# Patient Record
Sex: FEMALE | Race: WHITE | HISPANIC OR LATINO | Employment: OTHER | URBAN - METROPOLITAN AREA
[De-identification: names, ages, dates, MRNs, and addresses within clinical notes are randomized per-mention and may not be internally consistent; named-entity substitution may affect disease eponyms.]

---

## 2018-10-18 ENCOUNTER — HOSPITAL ENCOUNTER (EMERGENCY)
Facility: HOSPITAL | Age: 58
Discharge: HOME | End: 2018-10-18
Attending: EMERGENCY MEDICINE
Payer: MEDICARE

## 2018-10-18 VITALS
DIASTOLIC BLOOD PRESSURE: 69 MMHG | TEMPERATURE: 98 F | SYSTOLIC BLOOD PRESSURE: 123 MMHG | BODY MASS INDEX: 39.38 KG/M2 | HEIGHT: 62 IN | WEIGHT: 214 LBS | HEART RATE: 81 BPM | RESPIRATION RATE: 17 BRPM | OXYGEN SATURATION: 99 %

## 2018-10-18 DIAGNOSIS — R42 DIZZINESS: Primary | ICD-10-CM

## 2018-10-18 PROBLEM — Z79.4 CONTROLLED TYPE 2 DIABETES MELLITUS WITH COMPLICATION, WITH LONG-TERM CURRENT USE OF INSULIN: Status: ACTIVE | Noted: 2018-10-18

## 2018-10-18 PROBLEM — E78.2 MIXED HYPERLIPIDEMIA: Status: ACTIVE | Noted: 2018-10-18

## 2018-10-18 PROBLEM — E11.8 CONTROLLED TYPE 2 DIABETES MELLITUS WITH COMPLICATION, WITH LONG-TERM CURRENT USE OF INSULIN: Status: ACTIVE | Noted: 2018-10-18

## 2018-10-18 PROBLEM — E03.9 HYPOTHYROIDISM: Status: ACTIVE | Noted: 2018-10-18

## 2018-10-18 PROBLEM — I10 ESSENTIAL HYPERTENSION: Status: ACTIVE | Noted: 2018-10-18

## 2018-10-18 LAB
ALBUMIN SERPL BCP-MCNC: 4.1 G/DL
ALP SERPL-CCNC: 100 U/L
ALT SERPL W/O P-5'-P-CCNC: 8 U/L
ANION GAP SERPL CALC-SCNC: 15 MMOL/L
AST SERPL-CCNC: 14 U/L
BASOPHILS # BLD AUTO: 0.05 K/UL
BASOPHILS NFR BLD: 0.4 %
BILIRUB SERPL-MCNC: 0.5 MG/DL
BUN SERPL-MCNC: 19 MG/DL
CALCIUM SERPL-MCNC: 9.9 MG/DL
CHLORIDE SERPL-SCNC: 106 MMOL/L
CO2 SERPL-SCNC: 18 MMOL/L
CREAT SERPL-MCNC: 1.3 MG/DL
DIFFERENTIAL METHOD: ABNORMAL
EOSINOPHIL # BLD AUTO: 0.1 K/UL
EOSINOPHIL NFR BLD: 0.5 %
ERYTHROCYTE [DISTWIDTH] IN BLOOD BY AUTOMATED COUNT: 16.9 %
EST. GFR  (AFRICAN AMERICAN): 51.5 ML/MIN/1.73 M^2
EST. GFR  (NON AFRICAN AMERICAN): 44.7 ML/MIN/1.73 M^2
GLUCOSE SERPL-MCNC: 284 MG/DL
HCT VFR BLD AUTO: 36.9 %
HGB BLD-MCNC: 11.7 G/DL
IMM GRANULOCYTES # BLD AUTO: 0.05 K/UL
IMM GRANULOCYTES NFR BLD AUTO: 0.4 %
LYMPHOCYTES # BLD AUTO: 1.6 K/UL
LYMPHOCYTES NFR BLD: 12 %
MCH RBC QN AUTO: 26.1 PG
MCHC RBC AUTO-ENTMCNC: 31.7 G/DL
MCV RBC AUTO: 82 FL
MONOCYTES # BLD AUTO: 0.6 K/UL
MONOCYTES NFR BLD: 4.6 %
NEUTROPHILS # BLD AUTO: 11.2 K/UL
NEUTROPHILS NFR BLD: 82.1 %
NRBC BLD-RTO: 0 /100 WBC
PLATELET # BLD AUTO: 373 K/UL
PMV BLD AUTO: 12.5 FL
POCT GLUCOSE: 260 MG/DL (ref 70–110)
POTASSIUM SERPL-SCNC: 4.2 MMOL/L
PROT SERPL-MCNC: 8.1 G/DL
RBC # BLD AUTO: 4.48 M/UL
SODIUM SERPL-SCNC: 139 MMOL/L
TROPONIN I SERPL DL<=0.01 NG/ML-MCNC: <0.006 NG/ML
WBC # BLD AUTO: 13.62 K/UL

## 2018-10-18 PROCEDURE — 25000003 PHARM REV CODE 250: Performed by: EMERGENCY MEDICINE

## 2018-10-18 PROCEDURE — 96361 HYDRATE IV INFUSION ADD-ON: CPT

## 2018-10-18 PROCEDURE — 84484 ASSAY OF TROPONIN QUANT: CPT

## 2018-10-18 PROCEDURE — 82962 GLUCOSE BLOOD TEST: CPT

## 2018-10-18 PROCEDURE — 99284 EMERGENCY DEPT VISIT MOD MDM: CPT | Mod: ,,, | Performed by: NURSE PRACTITIONER

## 2018-10-18 PROCEDURE — 93010 ELECTROCARDIOGRAM REPORT: CPT | Mod: ,,, | Performed by: INTERNAL MEDICINE

## 2018-10-18 PROCEDURE — 80053 COMPREHEN METABOLIC PANEL: CPT

## 2018-10-18 PROCEDURE — 99284 EMERGENCY DEPT VISIT MOD MDM: CPT | Mod: ,,, | Performed by: EMERGENCY MEDICINE

## 2018-10-18 PROCEDURE — 63600175 PHARM REV CODE 636 W HCPCS: Performed by: EMERGENCY MEDICINE

## 2018-10-18 PROCEDURE — 96374 THER/PROPH/DIAG INJ IV PUSH: CPT

## 2018-10-18 PROCEDURE — 85025 COMPLETE CBC W/AUTO DIFF WBC: CPT

## 2018-10-18 PROCEDURE — 99285 EMERGENCY DEPT VISIT HI MDM: CPT | Mod: 25

## 2018-10-18 RX ORDER — LISINOPRIL 2.5 MG/1
2.5 TABLET ORAL DAILY
COMMUNITY

## 2018-10-18 RX ORDER — DIAZEPAM 5 MG/1
10 TABLET ORAL
Status: DISCONTINUED | OUTPATIENT
Start: 2018-10-18 | End: 2018-10-18

## 2018-10-18 RX ORDER — GABAPENTIN 300 MG/1
300 CAPSULE ORAL 3 TIMES DAILY
Status: ON HOLD | COMMUNITY
End: 2022-08-01

## 2018-10-18 RX ORDER — ONDANSETRON 4 MG/1
4 TABLET, ORALLY DISINTEGRATING ORAL
Status: COMPLETED | OUTPATIENT
Start: 2018-10-18 | End: 2018-10-18

## 2018-10-18 RX ORDER — ALLOPURINOL 100 MG/1
100 TABLET ORAL DAILY
COMMUNITY

## 2018-10-18 RX ORDER — GLIPIZIDE 10 MG/1
10 TABLET ORAL
COMMUNITY
End: 2019-12-20 | Stop reason: SDUPTHER

## 2018-10-18 RX ORDER — ONDANSETRON 4 MG/1
4 TABLET, FILM COATED ORAL EVERY 6 HOURS PRN
Qty: 12 TABLET | Refills: 0 | Status: ON HOLD | OUTPATIENT
Start: 2018-10-18 | End: 2022-08-01

## 2018-10-18 RX ORDER — MECLIZINE HCL 12.5 MG 12.5 MG/1
25 TABLET ORAL
Status: COMPLETED | OUTPATIENT
Start: 2018-10-18 | End: 2018-10-18

## 2018-10-18 RX ORDER — LEVOTHYROXINE SODIUM 75 UG/1
75 TABLET ORAL DAILY
COMMUNITY

## 2018-10-18 RX ORDER — MECLIZINE HYDROCHLORIDE 25 MG/1
25 TABLET ORAL 3 TIMES DAILY PRN
Qty: 20 TABLET | Refills: 0 | Status: SHIPPED | OUTPATIENT
Start: 2018-10-18

## 2018-10-18 RX ORDER — DULOXETIN HYDROCHLORIDE 60 MG/1
60 CAPSULE, DELAYED RELEASE ORAL DAILY
COMMUNITY

## 2018-10-18 RX ORDER — VALACYCLOVIR HYDROCHLORIDE 500 MG/1
500 TABLET, FILM COATED ORAL 2 TIMES DAILY
COMMUNITY

## 2018-10-18 RX ORDER — SIMVASTATIN 40 MG/1
40 TABLET, FILM COATED ORAL NIGHTLY
COMMUNITY

## 2018-10-18 RX ORDER — OXYBUTYNIN CHLORIDE 10 MG/1
10 TABLET, EXTENDED RELEASE ORAL DAILY
Status: ON HOLD | COMMUNITY
End: 2022-08-01

## 2018-10-18 RX ORDER — METFORMIN HYDROCHLORIDE 1000 MG/1
1000 TABLET ORAL 2 TIMES DAILY WITH MEALS
COMMUNITY
End: 2019-12-20 | Stop reason: SDUPTHER

## 2018-10-18 RX ADMIN — MECLIZINE 25 MG: 12.5 TABLET ORAL at 05:10

## 2018-10-18 RX ADMIN — SODIUM CHLORIDE 1000 ML: 0.9 INJECTION, SOLUTION INTRAVENOUS at 04:10

## 2018-10-18 RX ADMIN — INSULIN HUMAN 5 UNITS: 100 INJECTION, SOLUTION PARENTERAL at 04:10

## 2018-10-18 RX ADMIN — ONDANSETRON 4 MG: 4 TABLET, ORALLY DISINTEGRATING ORAL at 03:10

## 2018-10-18 NOTE — ED NOTES
Physician at bedside.NEUROLOGY MD at bedside and pt is cleared by her--MRI is also WNL. Pt will be d/eduar soon

## 2018-10-18 NOTE — CONSULTS
[facility]  Vascular Neurology  Comprehensive Stroke Center  Consult Note    Inpatient consult to Vascular (Stroke) Neurology  Consult performed by: Brenda Lyman, ADAN, NP  Consult ordered by: Shannon Bridges MD  Reason for consult: dizziness        Assessment/Plan:       Dizziness    58 y.o. female with significant past medical history of DM II, HLD, HTN, hypothyroidism presented to hospital complaining of dizziness since Tuesday.    -MRI/A negative for acute findings       Controlled type 2 diabetes mellitus with complication, with long-term current use of insulin    -Stroke risk factor.  Glucose 284.  Tight glucose control.     Hypothyroidism    -Stroke risk factor.  No recorded TSH.  -Continue Synthroid     Mixed hyperlipidemia    -Stroke risk factor.  No recorded LDL.  -Continue Simvastatin     Essential hypertension    -Stroke risk factor.  Goal SBP<140         STROKE DOCUMENTATION          NIH Scale:  Interval: baseline (upon arrival/admit)  1a. Level Of Consciousness: 0-->Alert: keenly responsive  1b. LOC Questions: 0-->Answers both questions correctly  1c. LOC Commands: 0-->Performs both tasks correctly  2. Best Gaze: 0-->Normal  3. Visual: 0-->No visual loss  4. Facial Palsy: 0-->Normal symmetrical movements  5a. Motor Arm, Left: 0-->No drift: limb holds 90 (or 45) degrees for full 10 secs  5b. Motor Arm, Right: 0-->No drift: limb holds 90 (or 45) degrees for full 10 secs  6a. Motor Leg, Left: 0-->No drift: leg holds 30 degree position for full 5 secs  6b. Motor Leg, Right: 0-->No drift: leg holds 30 degree position for full 5 secs  7. Limb Ataxia: 0-->Absent  8. Sensory: 0-->Normal: no sensory loss  9. Best Language: 0-->No aphasia: normal  10. Dysarthria: 0-->Normal  11. Extinction and Inattention (formerly Neglect): 0-->No abnormality  Total (NIH Stroke Scale): 0    Modified Judie Score: 0  Greenwood Coma Scale:15   ABCD2 Score:    [id number] Score:   HAS -BLED  "Score:   ICH Score:   Hunt & Dubon Classification:       Subjective:     History of Present Illness:  Patient is a 58 y.o. female with significant past medical history of DM II, HLD, HTN, hypothyroidism presented to hospital complaining of dizziness since Tuesday.  The patient denies head trauma.  She describes her dizziness as sensation that she is moving.  The patient endorses N/V, HA, generalized weakness and fatigue but denies slurred speech, vision disturbance, or numbness.  She states her blood glucose has been "good" at home.    ED workup included an MRI/MRA brain that was negative for acute findings.  NIH 0.  Given physical exam findings, MRI results Vascular Neurology will sign off at this time.            Past Medical History:   Diagnosis Date    Diabetes mellitus      No past surgical history on file.  No family history on file.  Social History     Tobacco Use    Smoking status: Not on file   Substance Use Topics    Alcohol use: Not on file    Drug use: Not on file     Review of patient's allergies indicates:  No Known Allergies    Medications: I have reviewed the current medication administration record.    Current Outpatient Medications:     allopurinol (ZYLOPRIM) 100 MG tablet, Take 100 mg by mouth once daily., Disp: , Rfl:     DULoxetine (CYMBALTA) 60 MG capsule, Take 60 mg by mouth once daily., Disp: , Rfl:     gabapentin (NEURONTIN) 300 MG capsule, Take 300 mg by mouth 3 (three) times daily., Disp: , Rfl:     glipiZIDE (GLUCOTROL) 10 MG tablet, Take 10 mg by mouth 2 (two) times daily before meals., Disp: , Rfl:     levothyroxine (SYNTHROID) 75 MCG tablet, Take 75 mcg by mouth once daily., Disp: , Rfl:     lisinopril (PRINIVIL,ZESTRIL) 2.5 MG tablet, Take 2.5 mg by mouth once daily., Disp: , Rfl:     metFORMIN (GLUCOPHAGE) 1000 MG tablet, Take 1,000 mg by mouth 2 (two) times daily with meals., Disp: , Rfl:     oxybutynin (DITROPAN-XL) 10 MG 24 hr tablet, Take 10 mg by mouth once daily., " Disp: , Rfl:     simvastatin (ZOCOR) 40 MG tablet, Take 40 mg by mouth every evening., Disp: , Rfl:     valACYclovir (VALTREX) 500 MG tablet, Take 500 mg by mouth 2 (two) times daily., Disp: , Rfl:     meclizine (ANTIVERT) 25 mg tablet, Take 1 tablet (25 mg total) by mouth 3 (three) times daily as needed for Dizziness., Disp: 20 tablet, Rfl: 0      Review of Systems   Constitutional: Positive for fatigue. Negative for fever.   HENT: Negative for drooling and trouble swallowing.    Eyes: Negative for discharge, redness and visual disturbance.   Respiratory: Negative for cough and shortness of breath.    Cardiovascular: Negative for chest pain and palpitations.   Gastrointestinal: Positive for nausea and vomiting. Negative for abdominal pain and diarrhea.   Endocrine: Negative for cold intolerance and heat intolerance.   Genitourinary: Negative for hematuria.   Musculoskeletal: Positive for gait problem.   Skin: Negative for rash.   Allergic/Immunologic: Negative for environmental allergies and food allergies.   Neurological: Positive for dizziness and headaches. Negative for facial asymmetry, speech difficulty, weakness and numbness.   Psychiatric/Behavioral: Negative for agitation and confusion.     Objective:     Vital Signs (Most Recent):  Temp: 98.5 °F (36.9 °C) (10/18/18 0136)  Pulse: 103 (10/18/18 0136)  Resp: 16 (10/18/18 0136)  BP: 109/67 (10/18/18 0136)  SpO2: 97 % (10/18/18 0136)    Vital Signs Range (Last 24H):  Temp:  [98.5 °F (36.9 °C)]   Pulse:  [103]   Resp:  [16]   BP: (109)/(67)   SpO2:  [97 %]     Physical Exam   Constitutional: She is oriented to person, place, and time. She appears well-developed and well-nourished. No distress.   HENT:   Head: Normocephalic and atraumatic.   Right Ear: External ear normal.   Left Ear: External ear normal.   Nose: Nose normal.   Mouth/Throat: Oropharynx is clear and moist. No oropharyngeal exudate.   Eyes: Conjunctivae and EOM are normal. Pupils are equal,  round, and reactive to light. Right eye exhibits no discharge. Left eye exhibits no discharge. No scleral icterus.   Neck: Normal range of motion. Neck supple. No thyromegaly present.   Cardiovascular: Normal rate, regular rhythm and normal heart sounds.   Pulmonary/Chest: Effort normal and breath sounds normal. No respiratory distress. She has no wheezes. She has no rhonchi. She has no rales.   Abdominal: Soft. Bowel sounds are normal. She exhibits no distension. There is no hepatosplenomegaly. There is no tenderness.   Musculoskeletal: She exhibits no edema.   Lymphadenopathy:     She has no cervical adenopathy.   Neurological: She is alert and oriented to person, place, and time.   Skin: Skin is warm and dry. Capillary refill takes less than 2 seconds. She is not diaphoretic.   Psychiatric: She has a normal mood and affect.   Nursing note and vitals reviewed.      Neurological Exam:   LOC: alert  Attention Span: Good   Language: No aphasia  Articulation: No dysarthria  Orientation: Person, Place, Time   Visual Fields: Full  EOM (CN III, IV, VI): Full/intact  Pupils (CN II, III): PERRL  Facial Movement (CN VII): Symmetric facial expression    Motor: Arm left  Normal 5/5  Leg left  Normal 5/5  Arm right  Normal 5/5  Leg right Normal 5/5  Cebellar: No evidence of appendicular or axial ataxia      Laboratory:  CMP:   Recent Labs   Lab 10/18/18  0230   CALCIUM 9.9   ALBUMIN 4.1   PROT 8.1      K 4.2   CO2 18*      BUN 19   CREATININE 1.3   ALKPHOS 100   ALT 8*   AST 14   BILITOT 0.5     CBC:   Recent Labs   Lab 10/18/18  0230   WBC 13.62*   RBC 4.48   HGB 11.7*   HCT 36.9*   *   MCV 82   MCH 26.1*   MCHC 31.7*     Lipid Panel: No results for input(s): CHOL, LDLCALC, HDL, TRIG in the last 168 hours.  Coagulation: No results for input(s): PT, INR, APTT in the last 168 hours.  Hgb A1C: No results for input(s): HGBA1C in the last 168 hours.  TSH: No results for input(s): TSH in the last 168  hours.    Diagnostic Results:      Brain imaging:  MRI Brain 10/18/2018 negative for acute findings    Vessel Imaging:  MRA Brain 10/18/2018 negative for acute findings    Cardiac Evaluation:   None      Brenda Lyman DNP, NP  [facility]  Department of Vascular Neurology   [facility]

## 2018-10-18 NOTE — SUBJECTIVE & OBJECTIVE
Past Medical History:   Diagnosis Date    Diabetes mellitus      No past surgical history on file.  No family history on file.  Social History     Tobacco Use    Smoking status: Not on file   Substance Use Topics    Alcohol use: Not on file    Drug use: Not on file     Review of patient's allergies indicates:  No Known Allergies    Medications: I have reviewed the current medication administration record.    Current Outpatient Medications:     allopurinol (ZYLOPRIM) 100 MG tablet, Take 100 mg by mouth once daily., Disp: , Rfl:     DULoxetine (CYMBALTA) 60 MG capsule, Take 60 mg by mouth once daily., Disp: , Rfl:     gabapentin (NEURONTIN) 300 MG capsule, Take 300 mg by mouth 3 (three) times daily., Disp: , Rfl:     glipiZIDE (GLUCOTROL) 10 MG tablet, Take 10 mg by mouth 2 (two) times daily before meals., Disp: , Rfl:     levothyroxine (SYNTHROID) 75 MCG tablet, Take 75 mcg by mouth once daily., Disp: , Rfl:     lisinopril (PRINIVIL,ZESTRIL) 2.5 MG tablet, Take 2.5 mg by mouth once daily., Disp: , Rfl:     metFORMIN (GLUCOPHAGE) 1000 MG tablet, Take 1,000 mg by mouth 2 (two) times daily with meals., Disp: , Rfl:     oxybutynin (DITROPAN-XL) 10 MG 24 hr tablet, Take 10 mg by mouth once daily., Disp: , Rfl:     simvastatin (ZOCOR) 40 MG tablet, Take 40 mg by mouth every evening., Disp: , Rfl:     valACYclovir (VALTREX) 500 MG tablet, Take 500 mg by mouth 2 (two) times daily., Disp: , Rfl:     meclizine (ANTIVERT) 25 mg tablet, Take 1 tablet (25 mg total) by mouth 3 (three) times daily as needed for Dizziness., Disp: 20 tablet, Rfl: 0      Review of Systems   Constitutional: Positive for fatigue. Negative for fever.   HENT: Negative for drooling and trouble swallowing.    Eyes: Negative for discharge, redness and visual disturbance.   Respiratory: Negative for cough and shortness of breath.    Cardiovascular: Negative for chest pain and palpitations.   Gastrointestinal: Positive for nausea and  vomiting. Negative for abdominal pain and diarrhea.   Endocrine: Negative for cold intolerance and heat intolerance.   Genitourinary: Negative for hematuria.   Musculoskeletal: Positive for gait problem.   Skin: Negative for rash.   Allergic/Immunologic: Negative for environmental allergies and food allergies.   Neurological: Positive for dizziness and headaches. Negative for facial asymmetry, speech difficulty, weakness and numbness.   Psychiatric/Behavioral: Negative for agitation and confusion.     Objective:     Vital Signs (Most Recent):  Temp: 98.5 °F (36.9 °C) (10/18/18 0136)  Pulse: 103 (10/18/18 0136)  Resp: 16 (10/18/18 0136)  BP: 109/67 (10/18/18 0136)  SpO2: 97 % (10/18/18 0136)    Vital Signs Range (Last 24H):  Temp:  [98.5 °F (36.9 °C)]   Pulse:  [103]   Resp:  [16]   BP: (109)/(67)   SpO2:  [97 %]     Physical Exam   Constitutional: She is oriented to person, place, and time. She appears well-developed and well-nourished. No distress.   HENT:   Head: Normocephalic and atraumatic.   Right Ear: External ear normal.   Left Ear: External ear normal.   Nose: Nose normal.   Mouth/Throat: Oropharynx is clear and moist. No oropharyngeal exudate.   Eyes: Conjunctivae and EOM are normal. Pupils are equal, round, and reactive to light. Right eye exhibits no discharge. Left eye exhibits no discharge. No scleral icterus.   Neck: Normal range of motion. Neck supple. No thyromegaly present.   Cardiovascular: Normal rate, regular rhythm and normal heart sounds.   Pulmonary/Chest: Effort normal and breath sounds normal. No respiratory distress. She has no wheezes. She has no rhonchi. She has no rales.   Abdominal: Soft. Bowel sounds are normal. She exhibits no distension. There is no hepatosplenomegaly. There is no tenderness.   Musculoskeletal: She exhibits no edema.   Lymphadenopathy:     She has no cervical adenopathy.   Neurological: She is alert and oriented to person, place, and time.   Skin: Skin is warm and  dry. Capillary refill takes less than 2 seconds. She is not diaphoretic.   Psychiatric: She has a normal mood and affect.   Nursing note and vitals reviewed.      Neurological Exam:   LOC: alert  Attention Span: Good   Language: No aphasia  Articulation: No dysarthria  Orientation: Person, Place, Time   Visual Fields: Full  EOM (CN III, IV, VI): Full/intact  Pupils (CN II, III): PERRL  Facial Movement (CN VII): Symmetric facial expression    Motor: Arm left  Normal 5/5  Leg left  Normal 5/5  Arm right  Normal 5/5  Leg right Normal 5/5  Cebellar: No evidence of appendicular or axial ataxia      Laboratory:  CMP:   Recent Labs   Lab 10/18/18  0230   CALCIUM 9.9   ALBUMIN 4.1   PROT 8.1      K 4.2   CO2 18*      BUN 19   CREATININE 1.3   ALKPHOS 100   ALT 8*   AST 14   BILITOT 0.5     CBC:   Recent Labs   Lab 10/18/18  0230   WBC 13.62*   RBC 4.48   HGB 11.7*   HCT 36.9*   *   MCV 82   MCH 26.1*   MCHC 31.7*     Lipid Panel: No results for input(s): CHOL, LDLCALC, HDL, TRIG in the last 168 hours.  Coagulation: No results for input(s): PT, INR, APTT in the last 168 hours.  Hgb A1C: No results for input(s): HGBA1C in the last 168 hours.  TSH: No results for input(s): TSH in the last 168 hours.    Diagnostic Results:      Brain imaging:  MRI Brain 10/18/2018 negative for acute findings    Vessel Imaging:  MRA Brain 10/18/2018 negative for acute findings    Cardiac Evaluation:   None

## 2018-10-18 NOTE — ED PROVIDER NOTES
Encounter Date: 10/18/2018  SCRIBE #1 NOTE: I, Jalil Isabel, am scribing for, and in the presence of,  Zane Espinoza MD.      History     Chief Complaint   Patient presents with    Dizziness     Pt to ER c/o dizziness, HA, N/V and back pain starting yesterday.      Patient is a 58-year-old female with history of IDDM, herpes, anxiety, depression who presents to emergency department with dizziness.  Patient remarks acute onset of dizziness yesterday morning.  Endorses true vertiginous symptoms. States that is intermittent in nature.  Endorses 1 episode without dizziness lasting about an hour while she was showering.  Patient denies a prior history of dizziness.  Dizziness associated with some upper back pain. Patient denies any chest pain, palpitations, shortness of breath. Pt denies any confusion, changes in vision, slurring of speech, difficulty forming words, difficulty understanding this provider, unilateral weakness. No new medications.  No fevers at home.  Patient denies any sick contacts.  No exacerbating or relieving factors.          Review of patient's allergies indicates:  No Known Allergies  Past Medical History:   Diagnosis Date    Diabetes mellitus      No past surgical history on file.  No family history on file.  Social History     Tobacco Use    Smoking status: Not on file   Substance Use Topics    Alcohol use: Not on file    Drug use: Not on file     Review of Systems   Constitutional: Negative for chills and fever.   HENT: Negative for congestion.    Eyes: Negative for visual disturbance.   Respiratory: Negative for cough, shortness of breath and wheezing.    Cardiovascular: Negative for chest pain.   Gastrointestinal: Negative for abdominal distention, abdominal pain, blood in stool, constipation, diarrhea, nausea and vomiting.   Genitourinary: Negative for dysuria and hematuria.   Musculoskeletal: Negative for myalgias.   Skin: Negative for rash.   Neurological: Positive for dizziness and  light-headedness. Negative for syncope, facial asymmetry, speech difficulty, weakness, numbness and headaches.       Physical Exam     Initial Vitals [10/18/18 0136]   BP Pulse Resp Temp SpO2   109/67 103 16 98.5 °F (36.9 °C) 97 %      MAP       --         Physical Exam    Nursing note and vitals reviewed.  Constitutional: She appears well-developed and well-nourished. No distress.   HENT:   Head: Normocephalic and atraumatic.   Eyes: EOM are normal. Pupils are equal, round, and reactive to light.   Neck: Normal range of motion. Neck supple.   Cardiovascular: Normal rate, regular rhythm and normal heart sounds. Exam reveals no gallop and no friction rub.    No murmur heard.  Pulmonary/Chest: Breath sounds normal. No respiratory distress. She has no wheezes. She has no rhonchi. She has no rales.   Abdominal: Soft. She exhibits no distension. There is no tenderness. There is no rebound and no guarding.   Bedside ultrasound of aorta shows no obvious aneurysm at proximal, mid, distal aorta, or bifurcation.  Patient has no obvious dissection when visualized in longitudinal view.   Musculoskeletal: She exhibits no edema.   Neurological: She is alert and oriented to person, place, and time. No cranial nerve deficit.   CN II-XII are intact. Pt full motor strength and full sensation of upper and lower extremities. Normal reflexes. No dysdiadochokinesia. No pronator drift. Normal heal-to-shin. Pt is unsteady on feet with attempted ambulation.   Skin: Skin is warm and dry. No rash noted.   Psychiatric: She has a normal mood and affect. Her behavior is normal. Thought content normal.         ED Course   Procedures  Labs Reviewed   CBC W/ AUTO DIFFERENTIAL - Abnormal; Notable for the following components:       Result Value    WBC 13.62 (*)     Hemoglobin 11.7 (*)     Hematocrit 36.9 (*)     MCH 26.1 (*)     MCHC 31.7 (*)     RDW 16.9 (*)     Platelets 373 (*)     Gran # (ANC) 11.2 (*)     Immature Grans (Abs) 0.05 (*)     Gran%  82.1 (*)     Lymph% 12.0 (*)     All other components within normal limits   COMPREHENSIVE METABOLIC PANEL - Abnormal; Notable for the following components:    CO2 18 (*)     Glucose 284 (*)     ALT 8 (*)     eGFR if  51.5 (*)     eGFR if non  44.7 (*)     All other components within normal limits   POCT GLUCOSE - Abnormal; Notable for the following components:    POCT Glucose 260 (*)     All other components within normal limits   TROPONIN I   TROPONIN I    Narrative:     add on per Dr Espinoza order #[id number] 10/18/2018  03:33 TROP     EKG Readings: (Independently Interpreted)   Initial Reading: No STEMI. Rhythm: Normal Sinus Rhythm. Heart Rate: 87.     ECG Results          EKG 12-lead (Final result)  Result time 10/18/18 16:02:53    Final result by Interface, [facility] (10/18/18 16:02:53)                 Narrative:    Test Reason : R42,  Vent. Rate : 087 BPM     Atrial Rate : 087 BPM     P-R Int : 150 ms          QRS Dur : 078 ms      QT Int : 368 ms       P-R-T Axes : 055 011 042 degrees     QTc Int : 442 ms    Normal sinus rhythm  Cannot rule out Anterior infarct ,age undetermined  Abnormal ECG  No previous ECGs available  Confirmed by ASHLEY REICH MD ([id number]) on 10/18/2018 4:02:42 PM    Referred By: MARK   SELF           Confirmed By:ASHLEY REICH MD                            Imaging Results          MRA Brain without contrast (Final result)  Result time 10/18/18 04:12:55    Final result by Yandel Reyes MD (10/18/18 04:12:55)                 Impression:      1.  No MRI evidence of acute infarct or other acute intracranial abnormality.    2.  Unremarkable MRA head.      Electronically signed by: Yandel Reyes MD  Date:    10/18/2018  Time:    04:12             Narrative:    EXAMINATION:  MRI BRAIN WITHOUT CONTRAST; MRA BRAIN WITHOUT CONTRAST    CLINICAL HISTORY:  dizziness;; Dizziness;Dizziness and giddiness    TECHNIQUE:  Multiplanar multisequence MR imaging  of the brain was performed without contrast. Noncontrast 3D time-of-flight intracranial MR angiography was performed through the [place] with MIP reformatting.    COMPARISON:  None    FINDINGS:  MRI Brain:    There is no evidence of abnormal restricted diffusion to suggest acute infarction. There is mild generalized cerebral volume loss.  There is no intracranial hemorrhage, midline shift, or mass effect. There is no abnormal confluent parenchymal signal abnormality.  The ventricles are normal in size without hydrocephalus.  No extra-axial collections are detected.    Incidental note is made of partially empty sella configuration.  The craniocervical junction is within normal limits. The globes and orbits appear unremarkable. The major vascular flow voids are patent.    MRA head:    Anterior circulation:  The bilateral distal cervical, dino, cavernous and supraclinoid segments of the ICA's are patent without evidence of significant focal stenosis or aneurysm. The visualized bilateral anterior and middle cerebral arteries are patent without evidence for significant focal stenosis or aneurysm.  Incidental note is made of trifurcate configuration of the ACAs.  There are bilateral PCOMs.    Posterior circulation: The visualized basilar artery and posterior cerebral arteries are patent without evidence for significant focal stenosis or aneurysm.                               MRI Brain Without Contrast (Final result)  Result time 10/18/18 04:12:55    Final result by Yandel Reyes MD (10/18/18 04:12:55)                 Impression:      1.  No MRI evidence of acute infarct or other acute intracranial abnormality.    2.  Unremarkable MRA head.      Electronically signed by: Yandel Reyes MD  Date:    10/18/2018  Time:    04:12             Narrative:    EXAMINATION:  MRI BRAIN WITHOUT CONTRAST; MRA BRAIN WITHOUT CONTRAST    CLINICAL HISTORY:  dizziness;; Dizziness;Dizziness and  "giddiness    TECHNIQUE:  Multiplanar multisequence MR imaging of the brain was performed without contrast. Noncontrast 3D time-of-flight intracranial MR angiography was performed through the [place] with MIP reformatting.    COMPARISON:  None    FINDINGS:  MRI Brain:    There is no evidence of abnormal restricted diffusion to suggest acute infarction. There is mild generalized cerebral volume loss.  There is no intracranial hemorrhage, midline shift, or mass effect. There is no abnormal confluent parenchymal signal abnormality.  The ventricles are normal in size without hydrocephalus.  No extra-axial collections are detected.    Incidental note is made of partially empty sella configuration.  The craniocervical junction is within normal limits. The globes and orbits appear unremarkable. The major vascular flow voids are patent.    MRA head:    Anterior circulation:  The bilateral distal cervical, dino, cavernous and supraclinoid segments of the ICA's are patent without evidence of significant focal stenosis or aneurysm. The visualized bilateral anterior and middle cerebral arteries are patent without evidence for significant focal stenosis or aneurysm.  Incidental note is made of trifurcate configuration of the ACAs.  There are bilateral PCOMs.    Posterior circulation: The visualized basilar artery and posterior cerebral arteries are patent without evidence for significant focal stenosis or aneurysm.                               X-Ray Chest PA And Lateral (Final result)  Result time 10/18/18 03:33:30    Final result by Brennon Guajardo MD (10/18/18 03:33:30)                 Impression:      No acute cardiopulmonary finding.      Electronically signed by: Brennon Guajardo MD  Date:    10/18/2018  Time:    03:33             Narrative:    EXAMINATION:  XR CHEST PA AND LATERAL    CLINICAL HISTORY:  Provided history is "  Dizziness and giddiness".    TECHNIQUE:  Frontal and lateral views of the chest were " performed.    COMPARISON:  None.    FINDINGS:  Cardiac wires overlie the chest.  Cardiac silhouette is not enlarged.  No focal consolidation.  No sizable pleural effusion.  No pneumothorax.                                       APC / Resident Notes:   [facility]  This is an emergent evaluation of 58 y.o. female who presents to the emergency department with true vertiginous symptoms in the setting of insulin dependent diabetes, anxiety, depression.  Vital signs show mild tachycardia.  Patient is afebrile.  Normotensive. DDx includes but is not limited BPPV, vestibular labryinthitis, vestibular neuritis, Meniere's disease, acoustic neuroma.  Given patient's presentation, will perform MRI to evaluate for posterior stroke.  Have consulted vascular Neurology.  Patient given Zofran here in the emergency department.  Have ordered CBC and CMP, troponin, chest x-ray.  Will continue to evaluate as labs and imaging result.    Eliecer Bridges MD  PGY-4, [employer]  3:18 AM  10/18/2018    [facility] update:  MRI negative for acute findings. Pt evaluated by vascular neurology and recapitulated my findings. NIH of 0.  Will give patient meclizine for symptomatic treatment.  ENT follow-up.  Patient given 1 L of fluids here in the emergency department as well as small dose of IV insulin. Pt educated regarding results, projected clinical course, and reasons to return to the ER. Pt endorsed understanding. Discharged home in stable condition.    Eliecer Bridges MD  PGY-4, [facility]  4:20 AM  10/18/2018         Attending Attestation:   Physician Attestation Statement for Resident:  As the supervising MD   Physician Attestation Statement: I have personally seen and examined this patient.   I agree with the above history. -:   As the supervising MD I agree with the above PE.    As the supervising MD I agree with the above treatment, course, plan, and disposition.  I have reviewed and agree with the residents  interpretation of the following: lab data and EKG.            I have reviewed and concur with the resident's history, physical, assessment, and plan.  I have personally interviewed and examined the patient at bedside.  See below addendum for my evaluation and additional findings.    Briefly,  this is a 58 y.o.female with a history of diabetes, herpes, anxiety/depression presenting with 1 day acute onset of dizziness and lightheadedness.  Differential diagnosis includes:  BPPV, labyrinthitis, CVA, TIA, hypoglycemia, hyperglycemia.   Symptoms or vertiginous but intermittent in nature.  ECG without signs of ischemia or arrhythmia.  No significant neurologic findings however patient described following over sensation without any new unilateral weakness or aphasia.  No nystagmus on exam with mild gait instability.  Discussed case with vascular Neurology, who recommended MRI/MRA brain for further evaluation.  Imaging negative for any acute stroke, patient was treated with meclizine and discharged home with close follow-up.  Given exam, findings, likely BPPV it with plans for outpatient follow-up. Patient agreeable to discharge plan. Strict ED precautions and return instructions discussed at length and patient verbalized understanding. All questions were answered and ample time was given for questions.          Zane Espinoza DO    Dept of Emergency Medicine   [facility]  Spectralink: [id number]               Clinical Impression:   The encounter diagnosis was Dizziness.      Disposition:   Disposition: Discharged  Condition: Stable                        Shannon Bridges MD  Resident  10/18/18 0424       Shannon Bridges MD  Resident  10/18/18 0449       Zane Espinoza DO  10/21/18 2254

## 2018-10-18 NOTE — ASSESSMENT & PLAN NOTE
58 y.o. female with significant past medical history of DM II, HLD, HTN, hypothyroidism presented to hospital complaining of dizziness since Tuesday.    -MRI/A negative for acute findings

## 2018-10-18 NOTE — HPI
"Patient is a 58 y.o. female with significant past medical history of DM II, HLD, HTN, hypothyroidism presented to hospital complaining of dizziness since Tuesday.  The patient denies head trauma.  She describes her dizziness as sensation that she is moving.  The patient endorses N/V, HA, generalized weakness and fatigue but denies slurred speech, vision disturbance, or numbness.  She states her blood glucose has been "good" at home.    ED workup included an MRI/MRA brain that was negative for acute findings.  NIH 0.  Given physical exam findings, MRI results Vascular Neurology will sign off at this time.      "

## 2018-10-18 NOTE — ED NOTES
Sandi RN went with pt to MRI since pt is on tele and needs to be removed for procedure--an RN was to go with the pt.

## 2018-10-18 NOTE — ED TRIAGE NOTES
"Pt reports "bad dizzy spells", N/V x2, headache and back pain. Reports vomit is green and very liquid. Denies any OTC meds for symptoms. Back pain described as intermittent in the upper-mid region. Reports decreased appetite.     Patient Identifiers for Alee Lee checked and correct  LOC: The patient is awake, alert and aware of environment with an appropriate affect, the patient is oriented x 3 and speaking appropriate.  APPEARANCE: Patient resting comfortably and in no acute distress, patient is clean and well groomed, patient's clothing is properly fastened.  SKIN: The skin is warm and dry, patient has normal skin turgor and moist mucus membranes,no rashes or lesions.Skin Intact , No Breakdown Noted  Musculoskeletal :  Normal range of motion noted. Moves all extremeties well, No swelling or tenderness noted  RESPIRATORY: Airway is open and patent, respirations are spontaneous, patient has a normal effort and rate.  CARDIAC: Patient has a normal rate and rhythm, no periphreal edema noted, capillary refill < 3 seconds.   ABDOMEN: Soft and non tender to palpation, no distention noted.  PULSES: 2+  And symmetrical in all extremeties  NEUROLOGIC: PERRL. facial expression is symmetrical, patient moving all extremities, normal sensation in all extremities when touched with a finger.The patient is awake, alert and cooperative with a calm affect, patient is aware of environment.    Will continue to monitor    "

## 2018-10-18 NOTE — ED NOTES
Pt is not taking her insulin; due to costs; given her info on [facility] for assistance in her $ management and also her medicine.

## 2019-12-19 ENCOUNTER — HOSPITAL ENCOUNTER (EMERGENCY)
Facility: HOSPITAL | Age: 59
Discharge: HOME | End: 2019-12-20
Attending: EMERGENCY MEDICINE
Payer: COMMERCIAL

## 2019-12-19 DIAGNOSIS — E11.00 DIABETIC HYPEROSMOLAR NON-KETOTIC STATE: Primary | ICD-10-CM

## 2019-12-19 DIAGNOSIS — R07.9 CHEST PAIN: ICD-10-CM

## 2019-12-19 LAB
ALBUMIN SERPL BCP-MCNC: 3.9 G/DL (ref 3.5–5.2)
ALP SERPL-CCNC: 143 U/L (ref 55–135)
ALT SERPL W/O P-5'-P-CCNC: 11 U/L (ref 10–44)
ANION GAP SERPL CALC-SCNC: 15 MMOL/L (ref 8–16)
AST SERPL-CCNC: 10 U/L (ref 10–40)
BASOPHILS # BLD AUTO: 0.03 K/UL (ref 0–0.2)
BASOPHILS NFR BLD: 0.4 % (ref 0–1.9)
BILIRUB SERPL-MCNC: 0.6 MG/DL (ref 0.1–1)
BNP SERPL-MCNC: <10 PG/ML (ref 0–99)
BUN SERPL-MCNC: 17 MG/DL (ref 8–23)
CALCIUM SERPL-MCNC: 10 MG/DL (ref 8.7–10.5)
CHLORIDE SERPL-SCNC: 90 MMOL/L (ref 95–110)
CO2 SERPL-SCNC: 20 MMOL/L (ref 23–29)
CREAT SERPL-MCNC: 1.9 MG/DL (ref 0.5–1.4)
DIFFERENTIAL METHOD: ABNORMAL
EOSINOPHIL # BLD AUTO: 0.1 K/UL (ref 0–0.5)
EOSINOPHIL NFR BLD: 0.6 % (ref 0–8)
ERYTHROCYTE [DISTWIDTH] IN BLOOD BY AUTOMATED COUNT: 15.6 % (ref 11.5–14.5)
EST. GFR  (AFRICAN AMERICAN): 32.3 ML/MIN/1.73 M^2
EST. GFR  (NON AFRICAN AMERICAN): 28.1 ML/MIN/1.73 M^2
GLUCOSE SERPL-MCNC: 969 MG/DL (ref 70–110)
HCT VFR BLD AUTO: 38.7 % (ref 37–48.5)
HGB BLD-MCNC: 12.1 G/DL (ref 12–16)
IMM GRANULOCYTES # BLD AUTO: 0.03 K/UL (ref 0–0.04)
IMM GRANULOCYTES NFR BLD AUTO: 0.4 % (ref 0–0.5)
LYMPHOCYTES # BLD AUTO: 1.5 K/UL (ref 1–4.8)
LYMPHOCYTES NFR BLD: 18.2 % (ref 18–48)
MCH RBC QN AUTO: 26.1 PG (ref 27–31)
MCHC RBC AUTO-ENTMCNC: 31.3 G/DL (ref 32–36)
MCV RBC AUTO: 83 FL (ref 82–98)
MONOCYTES # BLD AUTO: 0.7 K/UL (ref 0.3–1)
MONOCYTES NFR BLD: 8.1 % (ref 4–15)
NEUTROPHILS # BLD AUTO: 6.1 K/UL (ref 1.8–7.7)
NEUTROPHILS NFR BLD: 72.3 % (ref 38–73)
NRBC BLD-RTO: 0 /100 WBC
PLATELET # BLD AUTO: 300 K/UL (ref 150–350)
PMV BLD AUTO: 13.6 FL (ref 9.2–12.9)
POTASSIUM SERPL-SCNC: 4.8 MMOL/L (ref 3.5–5.1)
PROT SERPL-MCNC: 7.6 G/DL (ref 6–8.4)
RBC # BLD AUTO: 4.64 M/UL (ref 4–5.4)
SODIUM SERPL-SCNC: 125 MMOL/L (ref 136–145)
TROPONIN I SERPL DL<=0.01 NG/ML-MCNC: <0.006 NG/ML (ref 0–0.03)
WBC # BLD AUTO: 8.4 K/UL (ref 3.9–12.7)

## 2019-12-19 PROCEDURE — 96360 HYDRATION IV INFUSION INIT: CPT

## 2019-12-19 PROCEDURE — 80053 COMPREHEN METABOLIC PANEL: CPT

## 2019-12-19 PROCEDURE — 99285 PR EMERGENCY DEPT VISIT,LEVEL V: ICD-10-PCS | Mod: ,,, | Performed by: EMERGENCY MEDICINE

## 2019-12-19 PROCEDURE — 63600175 PHARM REV CODE 636 W HCPCS: Performed by: EMERGENCY MEDICINE

## 2019-12-19 PROCEDURE — 83880 ASSAY OF NATRIURETIC PEPTIDE: CPT

## 2019-12-19 PROCEDURE — 85025 COMPLETE CBC W/AUTO DIFF WBC: CPT

## 2019-12-19 PROCEDURE — 84484 ASSAY OF TROPONIN QUANT: CPT

## 2019-12-19 PROCEDURE — 93010 ELECTROCARDIOGRAM REPORT: CPT | Mod: ,,, | Performed by: INTERNAL MEDICINE

## 2019-12-19 PROCEDURE — 93010 EKG 12-LEAD: ICD-10-PCS | Mod: ,,, | Performed by: INTERNAL MEDICINE

## 2019-12-19 PROCEDURE — 82962 GLUCOSE BLOOD TEST: CPT

## 2019-12-19 PROCEDURE — 99285 EMERGENCY DEPT VISIT HI MDM: CPT | Mod: 25

## 2019-12-19 PROCEDURE — 99285 EMERGENCY DEPT VISIT HI MDM: CPT | Mod: ,,, | Performed by: EMERGENCY MEDICINE

## 2019-12-19 PROCEDURE — 96361 HYDRATE IV INFUSION ADD-ON: CPT

## 2019-12-19 PROCEDURE — 93005 ELECTROCARDIOGRAM TRACING: CPT

## 2019-12-19 PROCEDURE — 25000003 PHARM REV CODE 250: Performed by: EMERGENCY MEDICINE

## 2019-12-19 RX ORDER — GLIPIZIDE 10 MG/1
10 TABLET ORAL
Status: COMPLETED | OUTPATIENT
Start: 2019-12-19 | End: 2019-12-19

## 2019-12-19 RX ORDER — METFORMIN HYDROCHLORIDE 500 MG/1
1000 TABLET ORAL
Status: DISCONTINUED | OUTPATIENT
Start: 2019-12-19 | End: 2019-12-19

## 2019-12-19 RX ORDER — ASPIRIN 325 MG
325 TABLET ORAL
Status: COMPLETED | OUTPATIENT
Start: 2019-12-19 | End: 2019-12-19

## 2019-12-19 RX ADMIN — SODIUM CHLORIDE 1000 ML: 0.9 INJECTION, SOLUTION INTRAVENOUS at 10:12

## 2019-12-19 RX ADMIN — GLIPIZIDE 10 MG: 10 TABLET ORAL at 11:12

## 2019-12-19 RX ADMIN — SODIUM CHLORIDE 500 ML: 0.9 INJECTION, SOLUTION INTRAVENOUS at 08:12

## 2019-12-19 RX ADMIN — ASPIRIN 325 MG ORAL TABLET 325 MG: 325 PILL ORAL at 08:12

## 2019-12-20 VITALS
BODY MASS INDEX: 38.64 KG/M2 | HEART RATE: 84 BPM | SYSTOLIC BLOOD PRESSURE: 135 MMHG | DIASTOLIC BLOOD PRESSURE: 62 MMHG | RESPIRATION RATE: 17 BRPM | OXYGEN SATURATION: 95 % | WEIGHT: 210 LBS | HEIGHT: 62 IN | TEMPERATURE: 99 F

## 2019-12-20 LAB
BUN SERPL-MCNC: 18 MG/DL (ref 6–30)
CHLORIDE SERPL-SCNC: 96 MMOL/L (ref 95–110)
CREAT SERPL-MCNC: 1.1 MG/DL (ref 0.5–1.4)
GLUCOSE SERPL-MCNC: 699 MG/DL (ref 70–110)
HCT VFR BLD CALC: 38 %PCV (ref 36–54)
POC IONIZED CALCIUM: 1.19 MMOL/L (ref 1.06–1.42)
POC TCO2 (MEASURED): 22 MMOL/L (ref 23–29)
POCT GLUCOSE: 497 MG/DL (ref 70–110)
POCT GLUCOSE: >500 MG/DL (ref 70–110)
POCT GLUCOSE: >500 MG/DL (ref 70–110)
POTASSIUM BLD-SCNC: 4.1 MMOL/L (ref 3.5–5.1)
SAMPLE: ABNORMAL
SODIUM BLD-SCNC: 132 MMOL/L (ref 136–145)

## 2019-12-20 PROCEDURE — 25000003 PHARM REV CODE 250: Performed by: EMERGENCY MEDICINE

## 2019-12-20 PROCEDURE — 63600175 PHARM REV CODE 636 W HCPCS: Performed by: EMERGENCY MEDICINE

## 2019-12-20 RX ORDER — METFORMIN HYDROCHLORIDE 500 MG/1
1000 TABLET ORAL 2 TIMES DAILY WITH MEALS
Status: DISCONTINUED | OUTPATIENT
Start: 2019-12-20 | End: 2019-12-20 | Stop reason: HOSPADM

## 2019-12-20 RX ORDER — GLIPIZIDE 10 MG/1
10 TABLET ORAL
Qty: 60 TABLET | Refills: 0 | Status: SHIPPED | OUTPATIENT
Start: 2019-12-20 | End: 2022-08-01

## 2019-12-20 RX ORDER — METFORMIN HYDROCHLORIDE 1000 MG/1
1000 TABLET ORAL 2 TIMES DAILY WITH MEALS
Qty: 60 TABLET | Refills: 0 | Status: SHIPPED | OUTPATIENT
Start: 2019-12-20 | End: 2022-08-01

## 2019-12-20 RX ORDER — METFORMIN HYDROCHLORIDE 500 MG/1
1000 TABLET ORAL 2 TIMES DAILY WITH MEALS
Status: DISCONTINUED | OUTPATIENT
Start: 2019-12-20 | End: 2019-12-20

## 2019-12-20 RX ORDER — SODIUM CHLORIDE 9 MG/ML
1000 INJECTION, SOLUTION INTRAVENOUS
Status: COMPLETED | OUTPATIENT
Start: 2019-12-20 | End: 2019-12-20

## 2019-12-20 RX ADMIN — METFORMIN HYDROCHLORIDE 1000 MG: 500 TABLET ORAL at 01:12

## 2019-12-20 RX ADMIN — SODIUM CHLORIDE 1000 ML: 0.9 INJECTION, SOLUTION INTRAVENOUS at 01:12

## 2019-12-20 NOTE — ED NOTES
Made arrangements to have the soc worker--Greer to call in AM ref her diabetes. Pt was given two RX also with a discount coupon. Pt feels better and was then discharged to lobby.
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
## 2019-12-20 NOTE — PROVIDER PROGRESS NOTES - EMERGENCY DEPT.
Signed out to me from previous attending pending repeat glucose which continues to decline.  Patient continues to be in no acute distress. Stable for discharge in outpatient follow-up.  Advised pt to follow up with PCP or return if concerning symptoms arise. Pt understands and agrees with plan. Will d/c home.
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
## 2019-12-20 NOTE — PLAN OF CARE
12/20 10:38 AM   (SW) called Pt to discuss RX assistance, Utility assistance, and local Mental Health resources. SW spoke briefly with Pt before getting disconnected. SW attempted twice to call Pt back, but no one answered and no  set-up.       Greer Gutierrez, [facility] [place]  [phone]

## 2022-04-05 DIAGNOSIS — Z12.31 ENCOUNTER FOR SCREENING MAMMOGRAM FOR MALIGNANT NEOPLASM OF BREAST: Primary | ICD-10-CM

## 2022-05-28 ENCOUNTER — HOSPITAL ENCOUNTER (EMERGENCY)
Facility: OTHER | Age: 62
Discharge: HOME | End: 2022-05-28
Attending: EMERGENCY MEDICINE
Payer: MEDICARE

## 2022-05-28 VITALS
SYSTOLIC BLOOD PRESSURE: 170 MMHG | WEIGHT: 205 LBS | RESPIRATION RATE: 22 BRPM | BODY MASS INDEX: 40.25 KG/M2 | TEMPERATURE: 98 F | OXYGEN SATURATION: 98 % | HEIGHT: 60 IN | HEART RATE: 86 BPM | DIASTOLIC BLOOD PRESSURE: 80 MMHG

## 2022-05-28 DIAGNOSIS — M25.332 SCAPHOLUNATE DISSOCIATION OF LEFT WRIST: Primary | ICD-10-CM
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
## 2022-05-28 DIAGNOSIS — S93.401A SPRAIN OF RIGHT ANKLE, UNSPECIFIED LIGAMENT, INITIAL ENCOUNTER: ICD-10-CM

## 2022-05-28 PROCEDURE — 29125 APPL SHORT ARM SPLINT STATIC: CPT | Mod: RT

## 2022-05-28 PROCEDURE — 99284 EMERGENCY DEPT VISIT MOD MDM: CPT | Mod: 25

## 2022-05-28 PROCEDURE — 96372 THER/PROPH/DIAG INJ SC/IM: CPT | Performed by: EMERGENCY MEDICINE

## 2022-05-28 PROCEDURE — 63600175 PHARM REV CODE 636 W HCPCS: Performed by: EMERGENCY MEDICINE

## 2022-05-28 PROCEDURE — 25000003 PHARM REV CODE 250: Performed by: EMERGENCY MEDICINE

## 2022-05-28 RX ORDER — OXYCODONE AND ACETAMINOPHEN 10; 325 MG/1; MG/1
1 TABLET ORAL EVERY 4 HOURS PRN
Qty: 15 TABLET | Refills: 0 | Status: SHIPPED | OUTPATIENT
Start: 2022-05-28 | End: 2022-06-28

## 2022-05-28 RX ORDER — KETOROLAC TROMETHAMINE 30 MG/ML
30 INJECTION, SOLUTION INTRAMUSCULAR; INTRAVENOUS
Status: COMPLETED | OUTPATIENT
Start: 2022-05-28 | End: 2022-05-28

## 2022-05-28 RX ORDER — OXYCODONE AND ACETAMINOPHEN 5; 325 MG/1; MG/1
1 TABLET ORAL
Status: COMPLETED | OUTPATIENT
Start: 2022-05-28 | End: 2022-05-28

## 2022-05-28 RX ORDER — IBUPROFEN 600 MG/1
600 TABLET ORAL EVERY 6 HOURS PRN
Qty: 20 TABLET | Refills: 0 | Status: SHIPPED | OUTPATIENT
Start: 2022-05-28 | End: 2022-06-28 | Stop reason: SDUPTHER

## 2022-05-28 RX ADMIN — OXYCODONE HYDROCHLORIDE AND ACETAMINOPHEN 1 TABLET: 5; 325 TABLET ORAL at 04:05

## 2022-05-28 RX ADMIN — OXYCODONE HYDROCHLORIDE AND ACETAMINOPHEN 1 TABLET: 5; 325 TABLET ORAL at 03:05

## 2022-05-28 RX ADMIN — KETOROLAC TROMETHAMINE 30 MG: 30 INJECTION, SOLUTION INTRAMUSCULAR at 04:05

## 2022-05-28 NOTE — ED TRIAGE NOTES
Pt. Arrived to the ER after falling 1 hour ago. Pt. Complaining of right ankle and left wrist pain.

## 2022-05-28 NOTE — DISCHARGE INSTRUCTIONS
Your wrist injury appears to be a scapholunate disassociation.  You may require surgery.  A referral has been placed to hand surgery.  Should receive a call from somebody on Monday or Tuesday to get you in to the hand clinic.    If you have worsening swelling, pain, inability to move fingers or cool to the touch fingers return immediately to the emergency department.

## 2022-05-28 NOTE — ED PROVIDER NOTES
Source of History:  The patient    Chief complaint:  Fall (Pt fell 1 hr pta c/o right ankle pain and left wrist pain. Wrist noted to be swollen )      HPI:  Alee Lee is a 62 y.o. female presenting with  complaint of a left wrist and right ankle pain that occurs suddenly approximately hour prior to arrival.  She had a slip and fall in the shower where she had a FOOSH injury.  Complains of severe sharp left wrist pain with swelling.  States it is radiates up her left upper extremity.  Also complains of right ankle pain with some swelling.  Was able ambulate on the ankle.      This is the extent to the patients complaints today here in the emergency department.    ROS: As per HPI and below:  Constitutional: No fever.  No chills.  Eyes: No visual changes.   ENT: No sore throat. No ear pain.  Urinary: No abnormal urination.  MSK:  Positive for right ankle pain and left wrist pain  Integument: No rashes or lesions.    Review of patient's allergies indicates:  No Known Allergies    PMH:  As per HPI and below:  Past Medical History:   Diagnosis Date    Depression     Diabetes mellitus     Herpes genitalis in women     Thyroid disease      Past Surgical History:   Procedure Laterality Date     SECTION      x3       Social History     Tobacco Use    Smoking status: Current Some Day Smoker    Smokeless tobacco: Never Used   Substance Use Topics    Alcohol use: Not Currently    Drug use: Not Currently       Physical Exam:    BP (!) 170/80 (BP Location: Right arm, Patient Position: Sitting)   Pulse 86   Temp 98.1 °F (36.7 °C) (Oral)   Resp (!) 22   Ht 5' (1.524 m)   Wt 93 kg (205 lb)   SpO2 98%   BMI 40.04 kg/m²   Nursing note and vital signs reviewed.  Constitutional: No acute distress.  Nontoxic  Head:  Normocephalic atraumatic  Eyes: No conjunctival injection.  Extraocular muscles are intact.  ENT: Normal phonation.  Musculoskeletal:  Some swelling to the lateral malleolus of the right ankle.   Tenderness to palpation lateral malleolus.  No obvious deformity.  2+ dorsalis pedis pulse.    Patient has swelling to the left wrist with deformity.  2+ radial pulse.  Able to wiggle fingers.  Also has tenderness palpation of left forearm elbow and shoulder.  There is no deformity or swelling of the forearm elbow or shoulder.  Skin: No rashes seen.  Good turgor.  No abrasions.  No ecchymoses.  Psych: Appropriate, conversant.    Splint Application    Date/Time: 5/28/2022 3:31 AM  Performed by: Jason Wilks DO  Authorized by: Jason Wilks DO   Location details: left wrist  Splint type: sugar tong  Supplies used: Ortho-Glass  Post-procedure: The splinted body part was neurovascularly unchanged following the procedure.  Patient tolerance: Patient tolerated the procedure well with no immediate complications           I decided to obtain the patient's medical records.  Summary of Medical Records:      MDM/ Differential Dx:   62 y.o. female with right ankle pain and left wrist pain and elbow pain after fall.  Will get x-rays.  I do suspect left wrist fracture.  Will give analgesia and observed.    ED Course as of 06/20/22 1237   Sat May 28, 2022   0324 X-Ray Ankle Complete Right  X-ray of ankle independently interpreted by myself shows no fracture dislocation. [SM]   0328 X-Ray Wrist Complete Left  X-ray of the wrist independently interpreted by myself shows a widening between the scaphoid and lunate consistent with a scapholunate disassociation/dislocation.  No obvious fracture seen. [SM]   0328 X-Ray Elbow Complete Left  X-ray of the elbow independently interpreted by myself shows no fracture dislocation. [SM]   0400 Updated the patient and answered all questions. [SM]   0439 Upon re-evaluation patient is feeling much better.  Will discharge to follow-up with hand clinic as an outpatient.    Patient to be discharged home in stable condition. Diagnosis and treatment plan explained to patient and/or family  member who is at bedside. I have answered all questions and the patient is satisfied with the plan of care. Strict return precautions given. The patient demonstrates understanding of the care plan. [SM]      ED Course User Index  [SM] Jason Wilks DO               Diagnostic Impression:    1. Scapholunate dissociation of left wrist    2. Fall    3. Sprain of right ankle, unspecified ligament, initial encounter         ED Disposition Condition    Discharge Stable          ED Prescriptions     Medication Sig Dispense Start Date End Date Auth. Provider    ibuprofen (ADVIL,MOTRIN) 600 MG tablet Take 1 tablet (600 mg total) by mouth every 6 (six) hours as needed for Pain. 20 tablet 5/28/2022  Jason Wilks DO    oxyCODONE-acetaminophen (PERCOCET)  mg per tablet Take 1 tablet by mouth every 4 (four) hours as needed for Pain. 15 tablet 5/28/2022  Jason Wilks DO        Follow-up Information     Follow up With Specialties Details Why Contact Info Additional Information    [facility] Schedule an appointment as soon as possible for a visit in 3 days  [address], [address]  [place] [address] [phone] [facility], [address]th [address] Please park in [address] and use [place] elevators           Jason Wilks, DO  05/28/22 5767       Jason Wilks, DO  06/20/22 8324

## 2022-05-30 ENCOUNTER — TELEPHONE (OUTPATIENT)
Dept: ORTHOPEDICS | Facility: CLINIC | Age: 62
End: 2022-05-30
Payer: MEDICARE

## 2022-05-30 ENCOUNTER — PATIENT MESSAGE (OUTPATIENT)
Dept: ORTHOPEDICS | Facility: CLINIC | Age: 62
End: 2022-05-30
Payer: MEDICARE

## 2022-05-30 NOTE — TELEPHONE ENCOUNTER
5/30/22 [facility] for pt to call the referrals department to schedule appt with ORTHOPEDICS[facility]

## 2022-05-30 NOTE — TELEPHONE ENCOUNTER
Attempted to reach pt. KAYCEE Requested a call back to the [facility] at [phone] with any questions, concerns and assistance scheduling a ED f/u appt 05/31/22.     MCM sent.

## 2022-06-02 ENCOUNTER — TELEPHONE (OUTPATIENT)
Dept: ORTHOPEDICS | Facility: CLINIC | Age: 62
End: 2022-06-02
Payer: MEDICARE

## 2022-06-02 DIAGNOSIS — R52 PAIN: Primary | ICD-10-CM

## 2022-06-03 ENCOUNTER — TELEPHONE (OUTPATIENT)
Dept: ORTHOPEDICS | Facility: CLINIC | Age: 62
End: 2022-06-03
Payer: MEDICARE

## 2022-06-03 ENCOUNTER — OFFICE VISIT (OUTPATIENT)
Dept: ORTHOPEDICS | Facility: CLINIC | Age: 62
End: 2022-06-03
Payer: COMMERCIAL

## 2022-06-03 ENCOUNTER — TELEPHONE (OUTPATIENT)
Dept: ORTHOPEDICS | Facility: CLINIC | Age: 62
End: 2022-06-03

## 2022-06-03 ENCOUNTER — HOSPITAL ENCOUNTER (OUTPATIENT)
Dept: RADIOLOGY | Facility: OTHER | Age: 62
Discharge: HOME | End: 2022-06-03
Attending: PHYSICIAN ASSISTANT
Payer: COMMERCIAL

## 2022-06-03 ENCOUNTER — PATIENT MESSAGE (OUTPATIENT)
Dept: ORTHOPEDICS | Facility: CLINIC | Age: 62
End: 2022-06-03

## 2022-06-03 VITALS
HEART RATE: 93 BPM | WEIGHT: 205 LBS | HEIGHT: 60 IN | BODY MASS INDEX: 40.25 KG/M2 | SYSTOLIC BLOOD PRESSURE: 119 MMHG | DIASTOLIC BLOOD PRESSURE: 77 MMHG

## 2022-06-03 DIAGNOSIS — R52 PAIN: ICD-10-CM

## 2022-06-03 DIAGNOSIS — M25.532 WRIST PAIN, ACUTE, LEFT: ICD-10-CM

## 2022-06-03 DIAGNOSIS — M25.332 SCAPHOLUNATE DISSOCIATION OF LEFT WRIST: Primary | ICD-10-CM

## 2022-06-03 PROCEDURE — 99999 PR PBB SHADOW E&M-EST. PATIENT-LVL IV: ICD-10-PCS | Mod: PBBFAC,,, | Performed by: PHYSICIAN ASSISTANT

## 2022-06-03 PROCEDURE — 73110 X-RAY EXAM OF WRIST: CPT | Mod: TC,FY,LT

## 2022-06-03 PROCEDURE — 73110 X-RAY EXAM OF WRIST: CPT | Mod: 26,LT,, | Performed by: RADIOLOGY

## 2022-06-03 PROCEDURE — 3008F PR BODY MASS INDEX (BMI) DOCUMENTED: ICD-10-PCS | Mod: CPTII,S$GLB,, | Performed by: PHYSICIAN ASSISTANT

## 2022-06-03 PROCEDURE — 3078F DIAST BP <80 MM HG: CPT | Mod: CPTII,S$GLB,, | Performed by: PHYSICIAN ASSISTANT

## 2022-06-03 PROCEDURE — 29125 PR APPLY FOREARM SPLINT,STATIC: ICD-10-PCS | Mod: LT,S$GLB,, | Performed by: PHYSICIAN ASSISTANT

## 2022-06-03 PROCEDURE — 1159F MED LIST DOCD IN RCRD: CPT | Mod: CPTII,S$GLB,, | Performed by: PHYSICIAN ASSISTANT

## 2022-06-03 PROCEDURE — 1159F PR MEDICATION LIST DOCUMENTED IN MEDICAL RECORD: ICD-10-PCS | Mod: CPTII,S$GLB,, | Performed by: PHYSICIAN ASSISTANT

## 2022-06-03 PROCEDURE — 3078F PR MOST RECENT DIASTOLIC BLOOD PRESSURE < 80 MM HG: ICD-10-PCS | Mod: CPTII,S$GLB,, | Performed by: PHYSICIAN ASSISTANT

## 2022-06-03 PROCEDURE — 4010F ACE/ARB THERAPY RXD/TAKEN: CPT | Mod: CPTII,S$GLB,, | Performed by: PHYSICIAN ASSISTANT

## 2022-06-03 PROCEDURE — 99999 PR PBB SHADOW E&M-EST. PATIENT-LVL IV: CPT | Mod: PBBFAC,,, | Performed by: PHYSICIAN ASSISTANT

## 2022-06-03 PROCEDURE — 4010F PR ACE/ARB THEARPY RXD/TAKEN: ICD-10-PCS | Mod: CPTII,S$GLB,, | Performed by: PHYSICIAN ASSISTANT

## 2022-06-03 PROCEDURE — 73110 XR WRIST COMPLETE 3 VIEWS LEFT: ICD-10-PCS | Mod: 26,LT,, | Performed by: RADIOLOGY

## 2022-06-03 PROCEDURE — 3008F BODY MASS INDEX DOCD: CPT | Mod: CPTII,S$GLB,, | Performed by: PHYSICIAN ASSISTANT

## 2022-06-03 PROCEDURE — 99204 OFFICE O/P NEW MOD 45 MIN: CPT | Mod: 25,S$GLB,, | Performed by: PHYSICIAN ASSISTANT

## 2022-06-03 PROCEDURE — 3074F SYST BP LT 130 MM HG: CPT | Mod: CPTII,S$GLB,, | Performed by: PHYSICIAN ASSISTANT

## 2022-06-03 PROCEDURE — 1160F PR REVIEW ALL MEDS BY PRESCRIBER/CLIN PHARMACIST DOCUMENTED: ICD-10-PCS | Mod: CPTII,S$GLB,, | Performed by: PHYSICIAN ASSISTANT

## 2022-06-03 PROCEDURE — 1160F RVW MEDS BY RX/DR IN RCRD: CPT | Mod: CPTII,S$GLB,, | Performed by: PHYSICIAN ASSISTANT

## 2022-06-03 PROCEDURE — 99204 PR OFFICE/OUTPT VISIT, NEW, LEVL IV, 45-59 MIN: ICD-10-PCS | Mod: 25,S$GLB,, | Performed by: PHYSICIAN ASSISTANT

## 2022-06-03 PROCEDURE — 29125 APPL SHORT ARM SPLINT STATIC: CPT | Mod: LT,S$GLB,, | Performed by: PHYSICIAN ASSISTANT

## 2022-06-03 PROCEDURE — 3074F PR MOST RECENT SYSTOLIC BLOOD PRESSURE < 130 MM HG: ICD-10-PCS | Mod: CPTII,S$GLB,, | Performed by: PHYSICIAN ASSISTANT

## 2022-06-03 NOTE — TELEPHONE ENCOUNTER
Tried calling pt several time but not answer.  A message was left and a portal message also. LVM on daughter's phone informing her of mom's MRI scheduled 6/5/22.  Ruthy was asked to inform mom of her appt.

## 2022-06-03 NOTE — PROGRESS NOTES
Subjective:      Patient ID: Alee Lee is a 62 y.o. female.    Chief Complaint: Pain of the Left Wrist      HPI  Alee Lee is a left hand dominant 62 y.o. female presenting today for follow up from the ED.  There was a history of trauma - she slipped and fell, landing on the outstretched left wrist.  Injury occurred 5/28/2022.  Immediate pain and swelling in the left wrist, also reported right ankle pain.  Patient was seen in the ED 5/28/2022, placed in an ortho glass splint and referred to the [facility] for follow-up of left wrist pain.  She is taking percocet 10/325 mg twice a day and ibuprofen to improve her pain.  She reports history of left carpal tunnel release.      Review of patient's allergies indicates:  No Known Allergies      Current Outpatient Medications   Medication Sig Dispense Refill    allopurinol (ZYLOPRIM) 100 MG tablet Take 100 mg by mouth once daily.      DULoxetine (CYMBALTA) 60 MG capsule Take 60 mg by mouth once daily.      gabapentin (NEURONTIN) 300 MG capsule Take 300 mg by mouth 3 (three) times daily.      ibuprofen (ADVIL,MOTRIN) 600 MG tablet Take 1 tablet (600 mg total) by mouth every 6 (six) hours as needed for Pain. 20 tablet 0    levothyroxine (SYNTHROID) 75 MCG tablet Take 75 mcg by mouth once daily.      lisinopril (PRINIVIL,ZESTRIL) 2.5 MG tablet Take 2.5 mg by mouth once daily.      meclizine (ANTIVERT) 25 mg tablet Take 1 tablet (25 mg total) by mouth 3 (three) times daily as needed for Dizziness. 20 tablet 0    ondansetron (ZOFRAN) 4 MG tablet Take 1 tablet (4 mg total) by mouth every 6 (six) hours as needed for Nausea. 12 tablet 0    oxybutynin (DITROPAN-XL) 10 MG 24 hr tablet Take 10 mg by mouth once daily.      oxyCODONE-acetaminophen (PERCOCET)  mg per tablet Take 1 tablet by mouth every 4 (four) hours as needed for Pain. 15 tablet 0    simvastatin (ZOCOR) 40 MG tablet Take 40 mg by mouth every evening.      valACYclovir (VALTREX) 500 MG tablet  Take 500 mg by mouth 2 (two) times daily.      glipiZIDE (GLUCOTROL) 10 MG tablet Take 1 tablet (10 mg total) by mouth 2 (two) times daily before meals. 60 tablet 0    metFORMIN (GLUCOPHAGE) 1000 MG tablet Take 1 tablet (1,000 mg total) by mouth 2 (two) times daily with meals. 60 tablet 0     No current facility-administered medications for this visit.       Past Medical History:   Diagnosis Date    Depression     Diabetes mellitus     Herpes genitalis in women     Thyroid disease        Past Surgical History:   Procedure Laterality Date     SECTION      x3         Review of Systems:  ROS:  Constitutional: no fever or chills  Skin: no rash or suspicious lesions  Musculoskeletal: See HPI.   Neurological: no headaches, lightheadedness, or dizziness. No numbness or tingling  Psychological/behavioral: no anxiety or depression      OBJECTIVE:     PHYSICAL EXAM:  Height: 5' (152.4 cm) Weight: 93 kg (205 lb)  Vitals:    22 1340   BP: 119/77   Pulse: 93   Weight: 93 kg (205 lb)   Height: 5' (1.524 m)   PainSc:   4     Vitals reviewed.  Constitutional: NAD. Patient appears well-developed and well-nourished.   HENT:   Head: Normocephalic and atraumatic.   Neck: Normal range of motion.   Cardiovascular: Normal rate.    Pulmonary/Chest: Effort normal. No respiratory distress.   Neurological: Patient is awake, alert, oriented.   Psychiatric: Patient has a normal mood and affect. Behavior is normal. Judgment and thought content normal.  Musculoskeletal:  No lacerations or abrasions.  Skin maceration in the web spaces from splint.  Mild-to-moderate edema of the left fingers, hand, and wrist compared to right.  Tender to palpation over the left wrist and hand.  Nontender over the left elbow.  She reports discomfort finger and wrist motion, wrist motion is very limited.  Neurovascularly intact-she reports some hypersensitivity in the ulnar nerve distribution on the left compared to right, good median and radial  sensation.  Good motor function, good capillary refill, 2+ radial pulses.    RADIOGRAPHS:  Left Wrist XRay, 6/3/2022  FINDINGS:  Persistent widening of the scapholunate interval suspicious for underlying ligamentous injury.     I see no acute fracture.  No significant soft tissue edema.     Impression:  Stable abnormal exam as above.       Left wrist XRay, 5/28/2022  FINDINGS:  No evidence of acute fracture or gross dislocation.  There is widening of the scapholunate interval measuring approximately 5 mm in diameter.  Mild degenerative changes in the wrist.  Slight negative ulnar variance.  Benign-appearing sclerotic lesion at the base of the 4th proximal phalanx.  Mild soft tissue edema.  No unexpected radiopaque foreign body.     Impression:  Wrist soft tissue edema and age-indeterminate widening of the scapholunate interval, potentially related to recent or remote scapholunate ligament injury.     No displaced fracture.  Consider follow-up radiographs (including dedicated scaphoid views) in 7-10 days if there is strong clinical concern for scaphoid fracture.    Left elbow XRay, 5/28/2022  FINDINGS:  Bones are slightly demineralized with subtle patchy lucencies in the radius shaft.  No acute fracture or dislocation.  Mild degenerative changes.  No sizeable joint effusion.  No unexpected radiopaque foreign body.     Impression:  No displaced fracture.    Comments: I have personally reviewed the imaging and I agree with the above radiologist's report.    ASSESSMENT/PLAN:   Alee was seen today for pain.    Diagnoses and all orders for this visit:    Scapholunate dissociation of left wrist    Wrist pain, acute, left           - We talked at length about the anatomy and pathophysiology of   Encounter Diagnoses   Name Primary?    Scapholunate dissociation of left wrist Yes    Wrist pain, acute, left        - repeat right wrist x-ray today.  - patient was placed in a well-padded thumb spica plaster splint.  - plan for  MRI right wrist  - follow-up after MRI to discuss possible surgical treatment  - call with questions or concerns    Disclaimer: This note has been generated using voice-recognition software. There may be typographical errors that have been missed during proof-reading.

## 2022-06-03 NOTE — TELEPHONE ENCOUNTER
LVM informing pt of MRI appt scheduled on 6/5/22 (Sunday) at 2:30 pm at [facility] on [facility] (across the [address] from [address]).  Pt was also informed of follow up with Dr Arrieta on 6/9/22 at 10:30 am.  A portal message was sent to pt also.

## 2022-06-06 ENCOUNTER — TELEPHONE (OUTPATIENT)
Dept: ORTHOPEDICS | Facility: CLINIC | Age: 62
End: 2022-06-06
Payer: MEDICARE

## 2022-06-08 ENCOUNTER — TELEPHONE (OUTPATIENT)
Dept: ORTHOPEDICS | Facility: CLINIC | Age: 62
End: 2022-06-08
Payer: MEDICARE

## 2022-06-10 ENCOUNTER — HOSPITAL ENCOUNTER (OUTPATIENT)
Dept: RADIOLOGY | Facility: HOSPITAL | Age: 62
Discharge: HOME | End: 2022-06-10
Attending: PHYSICIAN ASSISTANT
Payer: COMMERCIAL

## 2022-06-10 DIAGNOSIS — M25.532 WRIST PAIN, ACUTE, LEFT: ICD-10-CM

## 2022-06-10 DIAGNOSIS — M25.332 SCAPHOLUNATE DISSOCIATION OF LEFT WRIST: ICD-10-CM

## 2022-06-10 PROCEDURE — 73221 MRI JOINT UPR EXTREM W/O DYE: CPT | Mod: 26,LT,, | Performed by: RADIOLOGY

## 2022-06-10 PROCEDURE — 73221 MRI WRIST WITHOUT CONTRAST LEFT: ICD-10-PCS | Mod: 26,LT,, | Performed by: RADIOLOGY

## 2022-06-10 PROCEDURE — 73221 MRI JOINT UPR EXTREM W/O DYE: CPT | Mod: TC,LT

## 2022-06-16 ENCOUNTER — TELEPHONE (OUTPATIENT)
Dept: ORTHOPEDICS | Facility: CLINIC | Age: 62
End: 2022-06-16
Payer: MEDICARE

## 2022-06-21 ENCOUNTER — OFFICE VISIT (OUTPATIENT)
Dept: ORTHOPEDICS | Facility: CLINIC | Age: 62
End: 2022-06-21
Payer: COMMERCIAL

## 2022-06-21 VITALS
DIASTOLIC BLOOD PRESSURE: 81 MMHG | HEIGHT: 60 IN | BODY MASS INDEX: 40.25 KG/M2 | WEIGHT: 205 LBS | HEART RATE: 96 BPM | SYSTOLIC BLOOD PRESSURE: 128 MMHG

## 2022-06-21 DIAGNOSIS — S63.502A COMPLETE TEAR OF LIGAMENT OF WRIST, LEFT, INITIAL ENCOUNTER: Primary | ICD-10-CM

## 2022-06-21 PROCEDURE — 99214 PR OFFICE/OUTPT VISIT, EST, LEVL IV, 30-39 MIN: ICD-10-PCS | Mod: S$GLB,,, | Performed by: ORTHOPAEDIC SURGERY

## 2022-06-21 PROCEDURE — 99999 PR PBB SHADOW E&M-EST. PATIENT-LVL III: CPT | Mod: PBBFAC,,, | Performed by: ORTHOPAEDIC SURGERY

## 2022-06-21 PROCEDURE — 3079F DIAST BP 80-89 MM HG: CPT | Mod: CPTII,S$GLB,, | Performed by: ORTHOPAEDIC SURGERY

## 2022-06-21 PROCEDURE — 3079F PR MOST RECENT DIASTOLIC BLOOD PRESSURE 80-89 MM HG: ICD-10-PCS | Mod: CPTII,S$GLB,, | Performed by: ORTHOPAEDIC SURGERY

## 2022-06-21 PROCEDURE — 99214 OFFICE O/P EST MOD 30 MIN: CPT | Mod: S$GLB,,, | Performed by: ORTHOPAEDIC SURGERY

## 2022-06-21 PROCEDURE — 4010F ACE/ARB THERAPY RXD/TAKEN: CPT | Mod: CPTII,S$GLB,, | Performed by: ORTHOPAEDIC SURGERY

## 2022-06-21 PROCEDURE — 3074F PR MOST RECENT SYSTOLIC BLOOD PRESSURE < 130 MM HG: ICD-10-PCS | Mod: CPTII,S$GLB,, | Performed by: ORTHOPAEDIC SURGERY

## 2022-06-21 PROCEDURE — 3074F SYST BP LT 130 MM HG: CPT | Mod: CPTII,S$GLB,, | Performed by: ORTHOPAEDIC SURGERY

## 2022-06-21 PROCEDURE — 3008F BODY MASS INDEX DOCD: CPT | Mod: CPTII,S$GLB,, | Performed by: ORTHOPAEDIC SURGERY

## 2022-06-21 PROCEDURE — 3008F PR BODY MASS INDEX (BMI) DOCUMENTED: ICD-10-PCS | Mod: CPTII,S$GLB,, | Performed by: ORTHOPAEDIC SURGERY

## 2022-06-21 PROCEDURE — 1159F PR MEDICATION LIST DOCUMENTED IN MEDICAL RECORD: ICD-10-PCS | Mod: CPTII,S$GLB,, | Performed by: ORTHOPAEDIC SURGERY

## 2022-06-21 PROCEDURE — 99999 PR PBB SHADOW E&M-EST. PATIENT-LVL III: ICD-10-PCS | Mod: PBBFAC,,, | Performed by: ORTHOPAEDIC SURGERY

## 2022-06-21 PROCEDURE — 1159F MED LIST DOCD IN RCRD: CPT | Mod: CPTII,S$GLB,, | Performed by: ORTHOPAEDIC SURGERY

## 2022-06-21 PROCEDURE — 4010F PR ACE/ARB THEARPY RXD/TAKEN: ICD-10-PCS | Mod: CPTII,S$GLB,, | Performed by: ORTHOPAEDIC SURGERY

## 2022-06-21 NOTE — PROGRESS NOTES
Subjective:      Patient ID: Alee Lee is a 62 y.o. female.    Chief Complaint: Pain of the Left Wrist      HPI  Alee Lee is a left hand dominant 62 y.o. female presenting today for follow up left wrist injury, MRI results. She had an injury on 5/28/22 she had a fall and landed on the left wrist. xrays with scapholunate ligament dissociation, MRI was obtained consistent with scapholunate ligament tear. She has been immobilized in a splint. She reports some improvement in pain since her last visit. Denies numbness.     Review of patient's allergies indicates:  No Known Allergies      Current Outpatient Medications   Medication Sig Dispense Refill    allopurinol (ZYLOPRIM) 100 MG tablet Take 100 mg by mouth once daily.      DULoxetine (CYMBALTA) 60 MG capsule Take 60 mg by mouth once daily.      gabapentin (NEURONTIN) 300 MG capsule Take 300 mg by mouth 3 (three) times daily.      ibuprofen (ADVIL,MOTRIN) 600 MG tablet Take 1 tablet (600 mg total) by mouth every 6 (six) hours as needed for Pain. 20 tablet 0    levothyroxine (SYNTHROID) 75 MCG tablet Take 75 mcg by mouth once daily.      lisinopril (PRINIVIL,ZESTRIL) 2.5 MG tablet Take 2.5 mg by mouth once daily.      meclizine (ANTIVERT) 25 mg tablet Take 1 tablet (25 mg total) by mouth 3 (three) times daily as needed for Dizziness. 20 tablet 0    ondansetron (ZOFRAN) 4 MG tablet Take 1 tablet (4 mg total) by mouth every 6 (six) hours as needed for Nausea. 12 tablet 0    oxybutynin (DITROPAN-XL) 10 MG 24 hr tablet Take 10 mg by mouth once daily.      oxyCODONE-acetaminophen (PERCOCET)  mg per tablet Take 1 tablet by mouth every 4 (four) hours as needed for Pain. 15 tablet 0    simvastatin (ZOCOR) 40 MG tablet Take 40 mg by mouth every evening.      valACYclovir (VALTREX) 500 MG tablet Take 500 mg by mouth 2 (two) times daily.      glipiZIDE (GLUCOTROL) 10 MG tablet Take 1 tablet (10 mg total) by mouth 2 (two) times daily before meals. 60  tablet 0    metFORMIN (GLUCOPHAGE) 1000 MG tablet Take 1 tablet (1,000 mg total) by mouth 2 (two) times daily with meals. 60 tablet 0     No current facility-administered medications for this visit.       Past Medical History:   Diagnosis Date    Depression     Diabetes mellitus     Herpes genitalis in women     Thyroid disease        Past Surgical History:   Procedure Laterality Date     SECTION      x3       Review of Systems:  Constitutional: Negative for chills and fever.   Respiratory: Negative for cough and shortness of breath.    Gastrointestinal: Negative for nausea and vomiting.   Skin: Negative for rash.   Neurological: Negative for dizziness and headaches.   Psychiatric/Behavioral: Negative for depression.   MSK as in HPI       OBJECTIVE:     PHYSICAL EXAM:  /81   Pulse 96   Ht 5' (1.524 m)   Wt 93 kg (205 lb 0.4 oz)   BMI 40.04 kg/m²     GEN:  NAD, well-developed, well-groomed.  NEURO: Awake, alert, and oriented. Normal attention and concentration.    PSYCH: Normal mood and affect. Behavior is normal.  HEENT: No cervical lymphadenopathy noted.  CARDIOVASCULAR: Radial pulses 2+ bilaterally. No LE edema noted.  PULMONARY: Breath sounds normal. No respiratory distress.  SKIN: Intact, no rashes.      MSK:   LUE:  Good active ROM of the wrist and fingers. She reports increased pain with wrist motion. ttp dorsal wrist. AIN/PIN/Radial/Median/Ulnar Nerves assessed in isolation without deficit. Radial & Ulnar arteries palpated 2+. Capillary Refill <3s.      RADIOGRAPHS:  Xray left wrist 6/3/22   Impression:  Stable abnormal exam as above.    MRI left wrist 6/10/22   Impression:   Limited examination secondary to overlying casting material in place.   Evident nondisplaced fracture at the level of the distal radius with residual edema.   Widening of scapholunate joint with out visualized ligamentous integrity consistent with disruption of the scapholunate ligament.   Ulnar minus variance  with radial perforation of the TFC.  Comments: I have personally reviewed the imaging and I agree with the above radiologist's report.    ASSESSMENT/PLAN:       ICD-10-CM ICD-9-CM   1. Complete tear of ligament of wrist, left, initial encounter  S63.502A 842.00          No orders of the defined types were placed in this encounter.       Plan:   Discussed scapholunate ligament injury. Treatment options discussed. Patient wishes to proceed with a left wrist scapholunate ligament repair. Consents reviewed and signed in clinic all questions answered.   Left wrist brace provided today   RTC PO         The patient indicates understanding of these issues and agrees to the plan.    Ceci Tim PA-C  [facility]   [facility] [place]

## 2022-06-21 NOTE — PROGRESS NOTES
I have personally taken the history and examined the patient. I agree with the Hand Surgery PA's note. The plan will be :  Patient has scapholunate ligament injury we discussed repair and reconstruction versus leaving alone developing SLAC wrist patient is interested in surgery risks and benefits were explained in detail patient is disabled from back injury but she does not use a cane she also has had pain again she does not use cane or any kind of assistive device she understands postoperative protocol and not using her wrist risks and benefits were explained in detail patient will be scheduled for surgery  Patient also understands she will have pins placed in those will be removed at a later date

## 2022-06-24 DIAGNOSIS — M25.332 SCAPHOLUNATE DISSOCIATION OF LEFT WRIST: ICD-10-CM

## 2022-06-24 DIAGNOSIS — S63.502A COMPLETE TEAR OF LIGAMENT OF WRIST, LEFT, INITIAL ENCOUNTER: Primary | ICD-10-CM

## 2022-06-24 DIAGNOSIS — M25.532 WRIST PAIN, ACUTE, LEFT: ICD-10-CM

## 2022-06-28 RX ORDER — TRAMADOL HYDROCHLORIDE 50 MG/1
50 TABLET ORAL EVERY 6 HOURS PRN
Qty: 20 TABLET | Refills: 0 | Status: SHIPPED | OUTPATIENT
Start: 2022-06-28 | End: 2022-07-06 | Stop reason: SDUPTHER

## 2022-06-28 RX ORDER — IBUPROFEN 600 MG/1
600 TABLET ORAL EVERY 6 HOURS PRN
Qty: 42 TABLET | Refills: 0 | Status: SHIPPED | OUTPATIENT
Start: 2022-06-28

## 2022-06-30 ENCOUNTER — TELEPHONE (OUTPATIENT)
Dept: ORTHOPEDICS | Facility: CLINIC | Age: 62
End: 2022-06-30
Payer: MEDICARE

## 2022-06-30 ENCOUNTER — PATIENT MESSAGE (OUTPATIENT)
Dept: ORTHOPEDICS | Facility: CLINIC | Age: 62
End: 2022-06-30
Payer: MEDICARE

## 2022-06-30 NOTE — TELEPHONE ENCOUNTER
Spoke c pt. Informed pt of 845 AM :arrival time for 07/01/22 surgery at the [facility]. Reminded pt of NPO status and Post op appt. Pt expressed understanding & was thankful.

## 2022-06-30 NOTE — TELEPHONE ENCOUNTER
Attempted to contact pt. Left Voicemail. Informed pt of 8:45 AM arrival time for 7/1/2022 surgery at the [facility], NPO status & PO appt. Asked pt to return call to clinic at [phone] to confirm, MyChart message also sent.

## 2022-06-30 NOTE — TELEPHONE ENCOUNTER
Unable to alert pt of arrival time of 8:45 AM for 07/01/22 surgery at the [facility].

## 2022-07-05 ENCOUNTER — DOCUMENTATION ONLY (OUTPATIENT)
Dept: ORTHOPEDICS | Facility: CLINIC | Age: 62
End: 2022-07-05
Payer: MEDICARE

## 2022-07-05 ENCOUNTER — ANESTHESIA EVENT (OUTPATIENT)
Dept: SURGERY | Facility: HOSPITAL | Age: 62
End: 2022-07-05
Payer: MEDICARE

## 2022-07-05 DIAGNOSIS — Z41.9 SURGERY, ELECTIVE: Primary | ICD-10-CM

## 2022-07-05 NOTE — ANESTHESIA PREPROCEDURE EVALUATION
07/05/2022  Alee Lee is a 62 y.o., female.     Patient's chart and medical records reviewed.  OK to proceed to [facility].      Pre-op Assessment    I have reviewed the Patient Summary Reports.     I have reviewed the Nursing Notes. I have reviewed the NPO Status.      Review of Systems  Anesthesia Hx:  No problems with previous Anesthesia  Denies Family Hx of Anesthesia complications.   Denies Personal Hx of Anesthesia complications.   Social:  Non-Smoker    Hematology/Oncology:  Hematology Normal   Oncology Normal     EENT/Dental:EENT/Dental Normal   Cardiovascular:   Hypertension    Pulmonary:  Pulmonary Normal Denies Pneumonia  Denies COPD.    Hepatic/GI:   GERD    Neurological:  Neurology Normal    Endocrine:   Diabetes, type 2 Hypothyroidism    Psych:   Psychiatric History depression          Physical Exam  General: Well nourished and Cooperative    Airway:  Mallampati: II   Mouth Opening: Normal  Tongue: Normal    Dental:  Intact        Anesthesia Plan  Type of Anesthesia, risks & benefits discussed:    Anesthesia Type: Gen Natural Airway, MAC, Regional  Intra-op Monitoring Plan: Standard ASA Monitors  Post Op Pain Control Plan: multimodal analgesia, IV/PO Opioids PRN and peripheral nerve block  Induction:  IV  Informed Consent: Informed consent signed with the Patient and all parties understand the risks and agree with anesthesia plan.  All questions answered.   ASA Score: 2    Ready For Surgery From Anesthesia Perspective.     .

## 2022-07-05 NOTE — PROGRESS NOTES
Pt has been rebooked, message sent to Shantel to add patient back to surgery schedule for 7/8/2022. Pt will be notified on Thursday

## 2022-07-06 RX ORDER — TRAMADOL HYDROCHLORIDE 50 MG/1
50 TABLET ORAL EVERY 6 HOURS PRN
Qty: 20 TABLET | Refills: 0 | Status: SHIPPED | OUTPATIENT
Start: 2022-07-06

## 2022-07-07 ENCOUNTER — TELEPHONE (OUTPATIENT)
Dept: ORTHOPEDICS | Facility: CLINIC | Age: 62
End: 2022-07-07
Payer: MEDICARE

## 2022-07-07 NOTE — TELEPHONE ENCOUNTER
Attempted to reach pt to inform pt of 8:45 a.m. arrival time for 07/08/22 surgery at the [facility]. Pt needs to be reminded of NPO status & PO appt 07/20/22 (changed due to surgery date change).  My O message sent

## 2022-07-08 ENCOUNTER — ANESTHESIA (OUTPATIENT)
Dept: SURGERY | Facility: HOSPITAL | Age: 62
End: 2022-07-08
Payer: MEDICARE

## 2022-07-08 ENCOUNTER — TELEPHONE (OUTPATIENT)
Dept: ORTHOPEDICS | Facility: CLINIC | Age: 62
End: 2022-07-08
Payer: MEDICARE

## 2022-07-08 NOTE — TELEPHONE ENCOUNTER
Attempted to reach pt. Only number provided is not accepting calls. Follow up message sent to Jose Luis.

## 2022-07-20 ENCOUNTER — TELEPHONE (OUTPATIENT)
Dept: ORTHOPEDICS | Facility: CLINIC | Age: 62
End: 2022-07-20
Payer: MEDICARE

## 2022-07-20 NOTE — TELEPHONE ENCOUNTER
Spoke c pts mother.  offered and confirmed appt location & time c SURGERY Dr. Galeas 08/01/22. PO appt adjusted, book updated and Shanetl notified. Pts daughter expressed understanding & was thankful.       ----- Message from Tawana Suarez sent at 7/20/2022  1:07 PM CDT -----  Regarding: Ruthy  .Type: Patient Call Back    Who called: Ruthy  - daughter     What is the request in detail: needs to reschedule her procedure please. Please call     Can the clinic reply by [facility]? No     Would the patient rather a call back or a response via [facility]? Call     Best call back number: .[phone]

## 2022-07-29 ENCOUNTER — TELEPHONE (OUTPATIENT)
Dept: ORTHOPEDICS | Facility: CLINIC | Age: 62
End: 2022-07-29
Payer: MEDICARE

## 2022-07-29 NOTE — TELEPHONE ENCOUNTER
Spoke c pt. Informed pt of 8:15 a.m arrival time for 08/01/22 surgery at the [facility]. Reminded pt of NPO status. Pt expressed understanding & was thankful.

## 2022-07-31 NOTE — H&P
H&P  Orthopaedics    SUBJECTIVE:     History of Present Illness:  Patient is a left hand dominant 62 y.o. female presenting today for follow up left wrist injury, MRI results. She had an injury on 22 she had a fall and landed on the left wrist. xrays with scapholunate ligament dissociation, MRI was obtained consistent with scapholunate ligament tear. She has been immobilized in a splint. She reports some improvement in pain since her last visit. Denies numbness.         Review of patient's allergies indicates:  No Known Allergies    Review of patient's allergies indicates:  No Known Allergies    Past Medical History:   Diagnosis Date    Depression     Diabetes mellitus     Herpes genitalis in women     Thyroid disease      Past Surgical History:   Procedure Laterality Date     SECTION      x3     No family history on file.  Social History     Tobacco Use    Smoking status: Current Some Day Smoker    Smokeless tobacco: Never Used   Substance Use Topics    Alcohol use: Not Currently    Drug use: Not Currently        Review of Systems:  Patient denies constitutional symptoms, cardiac symptoms, respiratory symptoms, GI symptoms.  The remainder of the musculoskeletal ROS is included in the HPI.      OBJECTIVE:     Physical Exam:  Gen:  No acute distress  CV:  Peripherally well-perfused.  Pulses 2+ bilaterally.  Lungs:  Normal respiratory effort.  Abdomen:  Soft, non-tender, non-distended  Head/Neck:  Normocephalic.  Atraumatic. No TTP, AROM and PROM intact without pain  Neuro:  CN intact without deficit, SILT throughout B/L Upper & Lower Extremities      MSK:  LUE:  Good active ROM of the wrist and fingers. She reports increased pain with wrist motion. ttp dorsal wrist. AIN/PIN/Radial/Median/Ulnar Nerves assessed in isolation without deficit. Radial & Ulnar arteries palpated 2+. Capillary Refill <3s.      Diagnostic Results:  Xray left wrist 6/3/22   Impression:  Stable abnormal exam as  above.     MRI left wrist 6/10/22   Impression:   Limited examination secondary to overlying casting material in place.   Evident nondisplaced fracture at the level of the distal radius with residual edema.   Widening of scapholunate joint with out visualized ligamentous integrity consistent with disruption of the scapholunate ligament.   Ulnar minus variance with radial perforation of the TFC.    ASSESSMENT/PLAN:     A/P: Alee Lee is a 62 y.o. left wrist scapholunate ligament injury.      Plan:  - to OR for left wrist scapholunate ligament repair.       Tala Crowder MD  Orthopaedic Surgery Resident

## 2022-08-01 ENCOUNTER — HOSPITAL ENCOUNTER (OUTPATIENT)
Facility: HOSPITAL | Age: 62
Discharge: HOME | End: 2022-08-01
Attending: ORTHOPAEDIC SURGERY | Admitting: ORTHOPAEDIC SURGERY
Payer: MEDICARE

## 2022-08-01 VITALS
HEART RATE: 75 BPM | HEIGHT: 62 IN | TEMPERATURE: 98 F | BODY MASS INDEX: 37.73 KG/M2 | OXYGEN SATURATION: 100 % | SYSTOLIC BLOOD PRESSURE: 122 MMHG | DIASTOLIC BLOOD PRESSURE: 67 MMHG | WEIGHT: 205 LBS | RESPIRATION RATE: 16 BRPM

## 2022-08-01 DIAGNOSIS — S63.8X2A: ICD-10-CM

## 2022-08-01 LAB — POCT GLUCOSE: 206 MG/DL (ref 70–110)

## 2022-08-01 PROCEDURE — 76942 ECHO GUIDE FOR BIOPSY: CPT | Mod: 26,,, | Performed by: ANESTHESIOLOGY

## 2022-08-01 PROCEDURE — 25320 REPAIR/REVISE WRIST JOINT: CPT | Mod: LT,,, | Performed by: ORTHOPAEDIC SURGERY

## 2022-08-01 PROCEDURE — 88305 TISSUE EXAM BY PATHOLOGIST: ICD-10-PCS | Mod: 26,,, | Performed by: PATHOLOGY

## 2022-08-01 PROCEDURE — 37000009 HC ANESTHESIA EA ADD 15 MINS: Performed by: ORTHOPAEDIC SURGERY

## 2022-08-01 PROCEDURE — 76942 PR U/S GUIDANCE FOR NEEDLE GUIDANCE: ICD-10-PCS | Mod: 26,,, | Performed by: ANESTHESIOLOGY

## 2022-08-01 PROCEDURE — 63600175 PHARM REV CODE 636 W HCPCS: Performed by: NURSE ANESTHETIST, CERTIFIED REGISTERED

## 2022-08-01 PROCEDURE — 25000003 PHARM REV CODE 250: Performed by: SURGERY

## 2022-08-01 PROCEDURE — 64415 NJX AA&/STRD BRCH PLXS IMG: CPT | Mod: 59,LT,, | Performed by: ANESTHESIOLOGY

## 2022-08-01 PROCEDURE — 27201423 OPTIME MED/SURG SUP & DEVICES STERILE SUPPLY: Performed by: ORTHOPAEDIC SURGERY

## 2022-08-01 PROCEDURE — 36000706: Performed by: ORTHOPAEDIC SURGERY

## 2022-08-01 PROCEDURE — 64415 NJX AA&/STRD BRCH PLXS IMG: CPT | Mod: 59,LT | Performed by: ANESTHESIOLOGY

## 2022-08-01 PROCEDURE — 88305 TISSUE EXAM BY PATHOLOGIST: CPT | Mod: 26,,, | Performed by: PATHOLOGY

## 2022-08-01 PROCEDURE — C1769 GUIDE WIRE: HCPCS | Performed by: ORTHOPAEDIC SURGERY

## 2022-08-01 PROCEDURE — 63600175 PHARM REV CODE 636 W HCPCS: Performed by: STUDENT IN AN ORGANIZED HEALTH CARE EDUCATION/TRAINING PROGRAM

## 2022-08-01 PROCEDURE — 71000033 HC RECOVERY, INTIAL HOUR: Performed by: ORTHOPAEDIC SURGERY

## 2022-08-01 PROCEDURE — D9220A PRA ANESTHESIA: ICD-10-PCS | Mod: ANES,,, | Performed by: ANESTHESIOLOGY

## 2022-08-01 PROCEDURE — D9220A PRA ANESTHESIA: ICD-10-PCS | Mod: CRNA,,, | Performed by: NURSE ANESTHETIST, CERTIFIED REGISTERED

## 2022-08-01 PROCEDURE — 64415 PR NERVE BLOCK INJ, ANES/STEROID, BRACHIAL PLEXUS, INCL IMAG GUIDANCE: ICD-10-PCS | Mod: 59,LT,, | Performed by: ANESTHESIOLOGY

## 2022-08-01 PROCEDURE — D9220A PRA ANESTHESIA: Mod: ANES,,, | Performed by: ANESTHESIOLOGY

## 2022-08-01 PROCEDURE — 36000707: Performed by: ORTHOPAEDIC SURGERY

## 2022-08-01 PROCEDURE — D9220A PRA ANESTHESIA: Mod: CRNA,,, | Performed by: NURSE ANESTHETIST, CERTIFIED REGISTERED

## 2022-08-01 PROCEDURE — C1713 ANCHOR/SCREW BN/BN,TIS/BN: HCPCS | Performed by: ORTHOPAEDIC SURGERY

## 2022-08-01 PROCEDURE — 25320 PR REVISE WRIST JOINT,CARPAL INSTABIL: ICD-10-PCS | Mod: LT,,, | Performed by: ORTHOPAEDIC SURGERY

## 2022-08-01 PROCEDURE — 63600175 PHARM REV CODE 636 W HCPCS: Performed by: SURGERY

## 2022-08-01 PROCEDURE — 88305 TISSUE EXAM BY PATHOLOGIST: CPT | Performed by: PATHOLOGY

## 2022-08-01 PROCEDURE — 71000015 HC POSTOP RECOV 1ST HR: Performed by: ORTHOPAEDIC SURGERY

## 2022-08-01 PROCEDURE — 25000003 PHARM REV CODE 250: Performed by: ORTHOPAEDIC SURGERY

## 2022-08-01 PROCEDURE — 25000003 PHARM REV CODE 250: Performed by: ANESTHESIOLOGY

## 2022-08-01 PROCEDURE — 37000008 HC ANESTHESIA 1ST 15 MINUTES: Performed by: ORTHOPAEDIC SURGERY

## 2022-08-01 PROCEDURE — 25000003 PHARM REV CODE 250: Performed by: STUDENT IN AN ORGANIZED HEALTH CARE EDUCATION/TRAINING PROGRAM

## 2022-08-01 PROCEDURE — 25000003 PHARM REV CODE 250: Performed by: NURSE ANESTHETIST, CERTIFIED REGISTERED

## 2022-08-01 DEVICE — KIT INTERNALBRACE HAND/WRIST: Type: IMPLANTABLE DEVICE | Site: WRIST | Status: FUNCTIONAL

## 2022-08-01 RX ORDER — MIDAZOLAM HYDROCHLORIDE 1 MG/ML
0.5 INJECTION INTRAMUSCULAR; INTRAVENOUS
Status: DISCONTINUED | OUTPATIENT
Start: 2022-08-01 | End: 2022-08-01 | Stop reason: HOSPADM

## 2022-08-01 RX ORDER — ACETAMINOPHEN 500 MG
1000 TABLET ORAL
Status: DISCONTINUED | OUTPATIENT
Start: 2022-08-02 | End: 2022-08-01 | Stop reason: HOSPADM

## 2022-08-01 RX ORDER — ONDANSETRON 2 MG/ML
INJECTION INTRAMUSCULAR; INTRAVENOUS
Status: DISCONTINUED | OUTPATIENT
Start: 2022-08-01 | End: 2022-08-01

## 2022-08-01 RX ORDER — BUPIVACAINE HYDROCHLORIDE 5 MG/ML
INJECTION, SOLUTION EPIDURAL; INTRACAUDAL
Status: COMPLETED | OUTPATIENT
Start: 2022-08-01 | End: 2022-08-01

## 2022-08-01 RX ORDER — HALOPERIDOL 5 MG/ML
0.5 INJECTION INTRAMUSCULAR EVERY 10 MIN PRN
Status: DISCONTINUED | OUTPATIENT
Start: 2022-08-01 | End: 2022-08-01 | Stop reason: HOSPADM

## 2022-08-01 RX ORDER — MIDAZOLAM HYDROCHLORIDE 1 MG/ML
.5-4 INJECTION INTRAMUSCULAR; INTRAVENOUS
Status: DISPENSED | OUTPATIENT
Start: 2022-08-01

## 2022-08-01 RX ORDER — BACITRACIN ZINC 500 UNIT/G
OINTMENT (GRAM) TOPICAL
Status: DISCONTINUED | OUTPATIENT
Start: 2022-08-01 | End: 2022-08-01 | Stop reason: HOSPADM

## 2022-08-01 RX ORDER — CEFAZOLIN SODIUM 1 G/3ML
2 INJECTION, POWDER, FOR SOLUTION INTRAMUSCULAR; INTRAVENOUS
Status: ACTIVE | OUTPATIENT
Start: 2022-08-01

## 2022-08-01 RX ORDER — SODIUM CHLORIDE 0.9 % (FLUSH) 0.9 %
10 SYRINGE (ML) INJECTION
Status: DISCONTINUED | OUTPATIENT
Start: 2022-08-01 | End: 2022-08-01 | Stop reason: HOSPADM

## 2022-08-01 RX ORDER — FENTANYL CITRATE 50 UG/ML
25 INJECTION, SOLUTION INTRAMUSCULAR; INTRAVENOUS EVERY 5 MIN PRN
Status: DISCONTINUED | OUTPATIENT
Start: 2022-08-01 | End: 2022-08-01 | Stop reason: HOSPADM

## 2022-08-01 RX ORDER — OXYBUTYNIN CHLORIDE 15 MG/1
15 TABLET, EXTENDED RELEASE ORAL DAILY
COMMUNITY
Start: 2022-04-26

## 2022-08-01 RX ORDER — CELECOXIB 200 MG/1
400 CAPSULE ORAL
Status: DISCONTINUED | OUTPATIENT
Start: 2022-08-01 | End: 2022-08-01 | Stop reason: HOSPADM

## 2022-08-01 RX ORDER — FENTANYL CITRATE 50 UG/ML
25-200 INJECTION, SOLUTION INTRAMUSCULAR; INTRAVENOUS
Status: DISPENSED | OUTPATIENT
Start: 2022-08-01

## 2022-08-01 RX ORDER — OXYCODONE HYDROCHLORIDE 5 MG/1
5 TABLET ORAL
Status: DISCONTINUED | OUTPATIENT
Start: 2022-08-01 | End: 2022-08-01 | Stop reason: HOSPADM

## 2022-08-01 RX ORDER — MELOXICAM 15 MG/1
15 TABLET ORAL DAILY
COMMUNITY
Start: 2022-04-25

## 2022-08-01 RX ORDER — BUSPIRONE HYDROCHLORIDE 15 MG/1
TABLET ORAL
COMMUNITY
Start: 2022-04-02

## 2022-08-01 RX ORDER — PROPOFOL 10 MG/ML
VIAL (ML) INTRAVENOUS CONTINUOUS PRN
Status: DISCONTINUED | OUTPATIENT
Start: 2022-08-01 | End: 2022-08-01

## 2022-08-01 RX ORDER — FENTANYL CITRATE 50 UG/ML
INJECTION, SOLUTION INTRAMUSCULAR; INTRAVENOUS
Status: DISCONTINUED | OUTPATIENT
Start: 2022-08-01 | End: 2022-08-01

## 2022-08-01 RX ORDER — CELECOXIB 200 MG/1
400 CAPSULE ORAL ONCE
Status: ACTIVE | OUTPATIENT
Start: 2022-08-01

## 2022-08-01 RX ORDER — DEXMEDETOMIDINE HYDROCHLORIDE 100 UG/ML
INJECTION, SOLUTION INTRAVENOUS
Status: DISCONTINUED | OUTPATIENT
Start: 2022-08-01 | End: 2022-08-01

## 2022-08-01 RX ORDER — ONDANSETRON 8 MG/1
TABLET, ORALLY DISINTEGRATING ORAL
COMMUNITY
Start: 2022-06-03

## 2022-08-01 RX ORDER — GABAPENTIN 600 MG/1
600 TABLET ORAL 3 TIMES DAILY
COMMUNITY
Start: 2022-07-28

## 2022-08-01 RX ORDER — MUPIROCIN 20 MG/G
OINTMENT TOPICAL
Status: DISPENSED | OUTPATIENT
Start: 2022-08-01

## 2022-08-01 RX ORDER — LIDOCAINE HYDROCHLORIDE 20 MG/ML
INJECTION INTRAVENOUS
Status: DISCONTINUED | OUTPATIENT
Start: 2022-08-01 | End: 2022-08-01

## 2022-08-01 RX ORDER — QUETIAPINE FUMARATE 100 MG/1
TABLET, FILM COATED ORAL
COMMUNITY
Start: 2022-06-09

## 2022-08-01 RX ORDER — LIDOCAINE HYDROCHLORIDE 10 MG/ML
1 INJECTION, SOLUTION EPIDURAL; INFILTRATION; INTRACAUDAL; PERINEURAL ONCE
Status: ACTIVE | OUTPATIENT
Start: 2022-08-01

## 2022-08-01 RX ORDER — ACETAMINOPHEN 500 MG
1000 TABLET ORAL
Status: COMPLETED | OUTPATIENT
Start: 2022-08-01 | End: 2022-08-01

## 2022-08-01 RX ADMIN — PROPOFOL 125 MCG/KG/MIN: 10 INJECTION, EMULSION INTRAVENOUS at 10:08

## 2022-08-01 RX ADMIN — LIDOCAINE HYDROCHLORIDE 100 MG: 20 INJECTION, SOLUTION INTRAVENOUS at 10:08

## 2022-08-01 RX ADMIN — FENTANYL CITRATE 50 MCG: 50 INJECTION, SOLUTION INTRAMUSCULAR; INTRAVENOUS at 10:08

## 2022-08-01 RX ADMIN — ACETAMINOPHEN 1000 MG: 500 TABLET ORAL at 09:08

## 2022-08-01 RX ADMIN — MUPIROCIN: 20 OINTMENT TOPICAL at 09:08

## 2022-08-01 RX ADMIN — ONDANSETRON 4 MG: 2 INJECTION INTRAMUSCULAR; INTRAVENOUS at 11:08

## 2022-08-01 RX ADMIN — FENTANYL CITRATE 50 MCG: 50 INJECTION INTRAMUSCULAR; INTRAVENOUS at 10:08

## 2022-08-01 RX ADMIN — CEFAZOLIN 2 G: 330 INJECTION, POWDER, FOR SOLUTION INTRAMUSCULAR; INTRAVENOUS at 10:08

## 2022-08-01 RX ADMIN — DEXMEDETOMIDINE HYDROCHLORIDE 4 MCG: 100 INJECTION, SOLUTION, CONCENTRATE INTRAVENOUS at 10:08

## 2022-08-01 RX ADMIN — SODIUM CHLORIDE: 9 INJECTION, SOLUTION INTRAVENOUS at 09:08

## 2022-08-01 RX ADMIN — MIDAZOLAM 2 MG: 1 INJECTION INTRAMUSCULAR; INTRAVENOUS at 10:08

## 2022-08-01 RX ADMIN — BUPIVACAINE HYDROCHLORIDE 30 ML: 5 INJECTION, SOLUTION EPIDURAL; INTRACAUDAL; PERINEURAL at 10:08

## 2022-08-01 NOTE — ANESTHESIA POSTPROCEDURE EVALUATION
Anesthesia Post Evaluation    Patient: Alee Lee    Procedure(s) Performed: Procedure(s) (LRB):  REPAIR, LIGAMENT, WRIST (Left)    Final Anesthesia Type: general      Patient location during evaluation: PACU  Patient participation: Yes- Able to Participate  Level of consciousness: awake and alert  Post-procedure vital signs: reviewed and stable  Pain management: adequate  Airway patency: patent    PONV status at discharge: No PONV  Anesthetic complications: no      Cardiovascular status: blood pressure returned to baseline  Respiratory status: unassisted  Hydration status: euvolemic  Follow-up not needed.          Vitals Value Taken Time   /67 08/01/22 1316   Temp 36.5 °C (97.7 °F) 08/01/22 1315   Pulse 73 08/01/22 1322   Resp 14 08/01/22 1322   SpO2 100 % 08/01/22 1322   Vitals shown include unvalidated device data.      Event Time   Out of Recovery 13:25:00         Pain/Bryan Score: Pain Rating Prior to Med Admin: 0 (8/1/2022 10:02 AM)  Bryan Score: 10 (8/1/2022 12:50 PM)

## 2022-08-01 NOTE — BRIEF OP NOTE
[facility] ([facility])  Brief Operative Note    Surgery Date: 8/1/2022     Surgeon(s) and Role:     * Lyndsey Syed MD - Primary    Assisting Surgeon: None    Pre-op Diagnosis:  Surgery, elective [Z41.9]    Post-op Diagnosis:  Post-Op Diagnosis Codes:     * Surgery, elective [Z41.9]    Procedure(s) (LRB):  REPAIR, LIGAMENT, WRIST (Left)    Anesthesia: Regional    Operative Findings: See op note    Estimated Blood Loss: * No values recorded between 8/1/2022 11:12 AM and 8/1/2022 12:50 PM *         Specimens:   Specimen (24h ago, onward)             Start     Ordered    08/01/22 1238  Specimen to Pathology, Surgery Orthopedics  Once        Comments: Pre-op Diagnosis: Surgery, elective [Z41.9]Procedure(s):REPAIR, LIGAMENT, WRIST Number of specimens:1Name of specimens: 1. Dorsal wrist tissue, left     References:    Click here for ordering Quick Tip   Question Answer Comment   Procedure Type: Orthopedics    Which provider would you like to cc? LYNDSEY SYED    Release to patient Immediate        08/01/22 1238                  Discharge Note    OUTCOME: Patient tolerated treatment/procedure well without complication and is now ready for discharge.    DISPOSITION: Home or Self Care    FINAL DIAGNOSIS:    Problem List Items Addressed This Visit    None     Visit Diagnoses     Tear of left scapholunate ligament        Relevant Orders    Diet general    Call MD for:  temperature >100.4    Call MD for:  persistent nausea and vomiting    Call MD for:  severe uncontrolled pain    Call MD for:  difficulty breathing, headache or visual disturbances    Call MD for:  redness, tenderness, or signs of infection (pain, swelling, redness, odor or green/yellow discharge around incision site)    Call MD for:  hives    Call MD for:  persistent dizziness or light-headedness    Call MD for:  extreme fatigue    Non weight bearing    No driving, operating heavy equipment or signing legal documents while taking pain  medication    Leave dressing on - Keep it clean, dry, and intact until clinic visit    Ambulatory referral/consult to Physical/Occupational Therapy            FOLLOWUP: In clinic    DISCHARGE INSTRUCTIONS:    Discharge Procedure Orders   Ambulatory referral/consult to Physical/Occupational Therapy   Standing Status: Future   Referral Priority: Routine Referral Type: Physical Medicine   Referral Reason: Specialty Services Required   Requested Specialty: Occupational Therapy   Number of Visits Requested: 1     Diet general     Call MD for:  temperature >100.4     Call MD for:  persistent nausea and vomiting     Call MD for:  severe uncontrolled pain     Call MD for:  difficulty breathing, headache or visual disturbances     Call MD for:  redness, tenderness, or signs of infection (pain, swelling, redness, odor or green/yellow discharge around incision site)     Call MD for:  hives     Call MD for:  persistent dizziness or light-headedness     Call MD for:  extreme fatigue     No driving, operating heavy equipment or signing legal documents while taking pain medication     Leave dressing on - Keep it clean, dry, and intact until clinic visit     Non weight bearing

## 2022-08-01 NOTE — PLAN OF CARE
[facility]  Progress Note    SUBJECTIVE:     Chief Complaint   Patient presents with    Diabetes     follow up       HPI   Rianna Espinosa  is a 48 y.o. female here for follow up of her diabetes. She admits still drinking 2 diet cokes daily. She takes her meds as prescribed. FBG in AM has been around 200. Patient has no other new complaints/problems at this time.      Review of patient's allergies indicates:   Allergen Reactions    Codeine      Other reaction(s): Nausea,vomiting       Past Medical History:   Diagnosis Date    Diabetes mellitus type II     Gastroesophageal reflux     Hyperlipidemia     Hypertension     Obesity      Past Surgical History:   Procedure Laterality Date     SECTION      CHOLECYSTECTOMY      COLONOSCOPY N/A 2017    Procedure: COLONOSCOPY;  Surgeon: Gerardo Rachel MD;  Location: [facility];  Service: Endoscopy;  Laterality: N/A;    DILATION AND CURETTAGE OF UTERUS      none      TUBAL LIGATION       Family History   Problem Relation Age of Onset    Diabetes Father     Diabetes Son      Social History   Substance Use Topics    Smoking status: Current Every Day Smoker     Packs/day: 0.25     Years: 10.00    Smokeless tobacco: Never Used    Alcohol use No        Review of Systems   Constitutional: Negative for chills, fever, malaise/fatigue and weight loss.   HENT: Negative.    Eyes: Negative for blurred vision.   Respiratory: Negative.  Negative for cough and shortness of breath.    Cardiovascular: Negative.  Negative for chest pain.   Gastrointestinal: Negative.  Negative for abdominal pain, nausea and vomiting.   Genitourinary: Negative.    Neurological: Negative for dizziness, tremors, seizures, weakness and headaches.   Endo/Heme/Allergies: Negative for polydipsia.   Psychiatric/Behavioral: The patient is not nervous/anxious.    All other systems reviewed and are negative.    OBJECTIVE:     Vitals:    17 0814   BP: 126/76   Pulse: 82   Temp:  Patient ready to be discharged. VSS and in no distress.    Instructions given, patient verbalizes understanding. Pain assessed and addressed. Tolerates liquids by mouth with no nausea and vomiting.     Bedside pharmacy delivered medication   98.2 °F (36.8 °C)     Body mass index is 39.59 kg/m².    Physical Exam   Constitutional: She is oriented to person, place, and time and well-developed, well-nourished, and in no distress. No distress.   HENT:   Head: Normocephalic and atraumatic.   Nose: Nose normal.   Eyes: Conjunctivae and EOM are normal.   Cardiovascular: Normal rate, regular rhythm and normal heart sounds.  Exam reveals no gallop and no friction rub.    No murmur heard.  Pulmonary/Chest: Effort normal and breath sounds normal. No respiratory distress. She has no wheezes. She has no rales. She exhibits no tenderness.   Abdominal: Soft. Bowel sounds are normal. She exhibits no distension. There is no tenderness. There is no rebound.   Neurological: She is alert and oriented to person, place, and time.   Skin: Skin is warm. She is not diaphoretic.       Old records were reviewed. Labs and/or images were independently reviewed.    ASSESSMENT     1. Uncontrolled type 2 diabetes mellitus without complication, without long-term current use of insulin    2. Viral URI        PLAN:     Uncontrolled type 2 diabetes mellitus without complication, without long-term current use of insulin  -     Increase canagliflozin (INVOKANA) 300 mg Tab tablet; Take 1 tablet (300 mg total) by mouth once daily.  Dispense: 90 tablet; Refill: 3  -     Instructed patient to take daily glucose AM logs and to write them down to bring with on next visit. Advised patient to decrease intake of carbohydrates/simple sugars.         Viral URI  -     loratadine (CLARITIN) 10 mg tablet; Take 1 tablet (10 mg total) by mouth daily as needed for Allergies (or runny nose).  Dispense: 30 tablet; Refill: 0  -     OK to take tylenol as needed PRN fever. Take mucinex and or claritin to help decrease congestion. Educated patient to drink plenty of fluids. Instructed patient to call or RTC if symptoms persist or worsen.      RTC PRN or 2 weeks for DM follow-up.    Ian Viramontes MD  09/07/2017 8:32  AM

## 2022-08-01 NOTE — ANESTHESIA PROCEDURE NOTES
Left supraclavicular single injection    Patient location during procedure: pre-op   Block not for primary anesthetic.  Reason for block: at surgeon's request and post-op pain management   Post-op Pain Location: Left wrist pain   Start time: 8/1/2022 10:03 AM  Timeout: 8/1/2022 10:02 AM   End time: 8/1/2022 10:06 AM    Staffing  Authorizing Provider: Ang Lindquist MD  Performing Provider: Ang Lindquist MD    Preanesthetic Checklist  Completed: patient identified, IV checked, site marked, risks and benefits discussed, surgical consent, monitors and equipment checked, pre-op evaluation and timeout performed  Peripheral Block  Patient position: supine  Prep: ChloraPrep  Patient monitoring: heart rate, cardiac monitor, continuous pulse ox, continuous capnometry and frequent blood pressure checks  Block type: supraclavicular  Laterality: left  Injection technique: single shot  Needle  Needle type: Stimuplex   Needle gauge: 22 G  Needle length: 2 in  Needle localization: anatomical landmarks and ultrasound guidance   -ultrasound image captured on disc.  Assessment  Injection assessment: negative aspiration, negative parasthesia and local visualized surrounding nerve  Paresthesia pain: none  Heart rate change: no  Slow fractionated injection: yes  Pain Tolerance: comfortable throughout block and no complaints  Medications:    Medications: bupivacaine (pf) (MARCAINE) injection 0.5% - Perineural   30 mL - 8/1/2022 10:06:00 AM    Additional Notes  VSS.  [facility] RN monitoring vitals throughout procedure.  Patient tolerated procedure well.     30 mL bupivacaine 0.5% w/ epi 1:200k

## 2022-08-01 NOTE — TRANSFER OF CARE
"Anesthesia Transfer of Care Note    Patient: Alee Lee    Procedure(s) Performed: Procedure(s) (LRB):  REPAIR, LIGAMENT, WRIST (Left)    Patient location: PACU    Anesthesia Type: general    Transport from OR: Transported from OR on room air with adequate spontaneous ventilation. Transported from OR on 6-10 L/min O2 by face mask with adequate spontaneous ventilation    Post pain: adequate analgesia    Post assessment: no apparent anesthetic complications    Post vital signs: stable    Level of consciousness: awake    Nausea/Vomiting: no nausea/vomiting    Complications: none    Transfer of care protocol was followed      Last vitals:   Visit Vitals  BP (!) 107/58 (BP Location: Right arm, Patient Position: Lying)   Pulse 84   Temp 36.9 °C (98.5 °F) (Oral)   Resp 19   Ht 5' 2" (1.575 m)   Wt 93 kg (205 lb)   SpO2 97%   Breastfeeding No   BMI 37.49 kg/m²     "

## 2022-08-01 NOTE — DISCHARGE INSTRUCTIONS
AFTER HAND SURGERY    DOS:  Keep affected wrist elevated above the level of your heart  Check circulation frequently in fingers by pressing on the nail bed. Nail bed should turn white and then pink when released.  Keep surgical dressing clean, dry, and intact until clinic visit.   Exercise fingers to promote circulation.  Advance diet as tolerated  Resume home medications today.      DONT:  No driving for 24 hours or while taking narcotic pain medication      CALL PHYSICIAN FOR:  Obvious bleeding  Excessive swelling  Drainage (pus) from the wound  Pain unrelieved by pain medication.

## 2022-08-01 NOTE — OP NOTE
[facility] ([facility])  Surgery Department  Operative Note    SUMMARY     Date of Procedure: 8/1/2022     Procedure: reconstruction left scapholunate interosseous ligament, Pinning scaphoid to capitate, flouro use less than 1 hr, splint application left upper extremity    Surgeon(s) and Role:     * Lyndsey Arrieta MD - Primary    Assisting Surgeon: Nitin Isaacs MD    Pre-Operative Diagnosis: Surgery, elective [Z41.9]    Post-Operative Diagnosis: Post-Op Diagnosis Codes:     * Surgery, elective [Z41.9]    Anesthesia: Regional    Technical Procedures Used: surgery    Description of the Findings of the Procedure:  indication for procedure Ms Lee  is a 62-year-old female who injured her left wrist she also has limited range of motion on examination she has swelling and tenderness over her radiocarpal joint radiographically she had a scapholunate disruption that was static after much discussion with the patient fact that it did appear that she had much arthritis elected for surgical intervention risks benefits were explained to the patient in clinic consents were signed in clinic     Procedure in detail the correct site was marked with the patient's participation holding area patient underwent regional anesthesia was brought to the operating placed in supine position underwent MAC anesthesia well-padded nonsterile tourniquet was placed on the left upper extremity left upper extremity was prepped draped normal sterile fashion time-out was conducted for the correct procedure to be indicated IV antibiotics given patient preoperatively longitudinal incision was marked out between compartments 3 and 4 the arm was exsanguinated with an Esmarch after the time-out was conducted and IV antibiotics given the skin flaps were elevated the tendons were retracted out of the way the capsule was T'd in elevated immediately we could see that there was a fairly large gap between the scaphoid and the lunate a K-wire was  placed in the lunate and 1 in the scaphoid to act as joysticks for the reduction once the reduction was achieved two K-wires were placed under C-arm fluoroscopy in the scaphoid 1 in the proximal pole 1 in the distal pulled again this was done under C-arm fluoroscopy and 1 in the center center lunate the Arthrex internal brace system was utilized the proximal pole was drilled and the anchor was placed with FiberTape this was then performed for the lunate and again the 2 joysticks were utilized to hold the scapholunate interval in reduction as well as the scaphoid in slight extension the FiberTape was then placed with a new anchor into the lunate both anchors were tugged on to confirm that it had good fixation which they both did our attention was then turned to the distal pole the scaphoid of note and the distal pole the scaphoid this particular patient had very soft bone just performing the drilling technique much of the bone was just very friable would break apart and therefore the 3rd limb was not introduced however on range of motion this was stable using an extra FiberWire suture that was placed in the original anchor dorsal capsule indicis was also conducted at this time and a pin was introduced from the scaphoid to the capitate to hold it in position this was clipped and buried underneath the skin there may and soft tissues deep were closed with Vicryl Vicryl Monocryl Dermabond closed the skin after the areas irrigated copious amounts normal saline patient was placed in a well-padded splint tolerated suture was brought to cover area in stable condition     Postop plans patient keep the dressing clean dry intact see the patient back in 2 weeks time splint to be exchanged for a cast cast will be in place for another 4 weeks then patient will be scheduled for pin removal at the 6 week laury of note due to the fact that her distal pole was in such poor condition I did discuss with the patient about this in addition  if patient continues to have wrist pain is careful lunate did not heal will do a PRC when we discussed this postoperatively with the patient and her daughter as well    Significant Surgical Tasks Conducted by the Assistant(s), if Applicable: retraction    Complications: No    Estimated Blood Loss (EBL): * No values recorded between 8/1/2022 11:12 AM and 8/1/2022 12:50 PM *           Implants:   Implant Name Type Inv. Item Serial No.  Lot No. LRB No. Used Action   KIT INTERNALBRACE HAND/WRIST - [id number]  KIT INTERNALBRACE HAND/WRIST  [employer] [id number] Left 1 Implanted   GUIDEWIRE .[id number] - [id number]  GUIDEWIRE .[id number]  [employer] [id number] Left 1 Implanted and Explanted   ANCHOR DX SWIVELOCK SL 3.5X8 - [id number]  ANCHOR DX SWIVELOCK SL 3.5X8  [employer] [id number] Left 1 Implanted and Explanted   GUIDEWIRE .[id number] - [id number]  GUIDEWIRE .[id number]  [employer] [id number] Left 1 Implanted       Specimens:   Specimen (24h ago, onward)                 Start     Ordered    08/01/22 1238  Specimen to Pathology, Surgery Orthopedics  Once        Comments: Pre-op Diagnosis: Surgery, elective [Z41.9]Procedure(s):REPAIR, LIGAMENT, WRIST Number of specimens:1Name of specimens: 1. Dorsal wrist tissue, left     References:    Click here for ordering Quick Tip   Question Answer Comment   Procedure Type: Orthopedics    Which provider would you like to cc? ANNI SYED    Release to patient Immediate        08/01/22 1238                            Condition: Good    Disposition: PACU - hemodynamically stable.    Attestation: I performed the procedure.    Discharge Note    SUMMARY     Admit Date: 8/1/2022    Discharge Date and Time: 8/1/2022  2:00 PM    Hospital Course (synopsis of major diagnoses, care, treatment, and services provided during the course of the hospital stay): surgery     Final Diagnosis: Post-Op Diagnosis Codes:     * Surgery, elective [Z41.9]    Disposition: Home or Self Care    Follow Up/Patient  Instructions:     Medications:  Reconciled Home Medications:      Medication List        CHANGE how you take these medications      gabapentin 600 MG tablet  Commonly known as: NEURONTIN  Take 600 mg by mouth 3 (three) times daily.  What changed: Another medication with the same name was removed. Continue taking this medication, and follow the directions you see here.     oxybutynin 15 MG Tr24  Commonly known as: DITROPAN XL  Take 15 mg by mouth once daily.  What changed: Another medication with the same name was removed. Continue taking this medication, and follow the directions you see here.            CONTINUE taking these medications      allopurinoL 100 MG tablet  Commonly known as: ZYLOPRIM  Take 100 mg by mouth once daily.     busPIRone 15 MG tablet  Commonly known as: BUSPAR     DULoxetine 60 MG capsule  Commonly known as: CYMBALTA  Take 60 mg by mouth once daily.     glipiZIDE 10 MG tablet  Commonly known as: GLUCOTROL  Take 1 tablet (10 mg total) by mouth 2 (two) times daily before meals.     ibuprofen 600 MG tablet  Commonly known as: ADVIL,MOTRIN  Take 1 tablet (600 mg total) by mouth every 6 (six) hours as needed for Pain.     levothyroxine 75 MCG tablet  Commonly known as: SYNTHROID  Take 75 mcg by mouth once daily.     lisinopriL 2.5 MG tablet  Commonly known as: PRINIVIL,ZESTRIL  Take 2.5 mg by mouth once daily.     meclizine 25 mg tablet  Commonly known as: ANTIVERT  Take 1 tablet (25 mg total) by mouth 3 (three) times daily as needed for Dizziness.     meloxicam 15 MG tablet  Commonly known as: MOBIC  Take 15 mg by mouth once daily.     metFORMIN 1000 MG tablet  Commonly known as: GLUCOPHAGE  Take 1 tablet (1,000 mg total) by mouth 2 (two) times daily with meals.     ondansetron 8 MG Tbdl  Commonly known as: ZOFRAN-ODT     QUEtiapine 100 MG Tab  Commonly known as: SEROQUEL     simvastatin 40 MG tablet  Commonly known as: ZOCOR  Take 40 mg by mouth every evening.     traMADoL 50 mg tablet  Commonly  known as: ULTRAM  Take 1 tablet (50 mg total) by mouth every 6 (six) hours as needed for Pain (moderate to severe).     valACYclovir 500 MG tablet  Commonly known as: VALTREX  Take 500 mg by mouth 2 (two) times daily.            STOP taking these medications      ondansetron 4 MG tablet  Commonly known as: ZOFRAN            Discharge Procedure Orders   Ambulatory referral/consult to Physical/Occupational Therapy   Standing Status: Future   Referral Priority: Routine Referral Type: Physical Medicine   Referral Reason: Specialty Services Required   Requested Specialty: Occupational Therapy   Number of Visits Requested: 1     Diet general     Call MD for:  temperature >100.4     Call MD for:  persistent nausea and vomiting     Call MD for:  severe uncontrolled pain     Call MD for:  difficulty breathing, headache or visual disturbances     Call MD for:  redness, tenderness, or signs of infection (pain, swelling, redness, odor or green/yellow discharge around incision site)     Call MD for:  hives     Call MD for:  persistent dizziness or light-headedness     Call MD for:  extreme fatigue     No driving, operating heavy equipment or signing legal documents while taking pain medication     Leave dressing on - Keep it clean, dry, and intact until clinic visit     Non weight bearing

## 2022-08-08 LAB
FINAL PATHOLOGIC DIAGNOSIS: NORMAL
Lab: NORMAL

## 2022-08-15 ENCOUNTER — HOSPITAL ENCOUNTER (OUTPATIENT)
Dept: RADIOLOGY | Facility: OTHER | Age: 62
Discharge: HOME | End: 2022-08-15
Attending: PHYSICIAN ASSISTANT
Payer: MEDICARE

## 2022-08-15 ENCOUNTER — OFFICE VISIT (OUTPATIENT)
Dept: ORTHOPEDICS | Facility: CLINIC | Age: 62
End: 2022-08-15
Payer: MEDICARE

## 2022-08-15 VITALS — HEIGHT: 62 IN | BODY MASS INDEX: 37.73 KG/M2 | WEIGHT: 205 LBS

## 2022-08-15 DIAGNOSIS — Z98.890 POST-OPERATIVE STATE: Primary | ICD-10-CM

## 2022-08-15 DIAGNOSIS — Z98.890 POST-OPERATIVE STATE: ICD-10-CM

## 2022-08-15 PROCEDURE — 73110 X-RAY EXAM OF WRIST: CPT | Mod: TC,FY,LT

## 2022-08-15 PROCEDURE — 73110 XR WRIST COMPLETE 3 VIEWS LEFT: ICD-10-PCS | Mod: 26,LT,, | Performed by: INTERNAL MEDICINE

## 2022-08-15 PROCEDURE — 99024 PR POST-OP FOLLOW-UP VISIT: ICD-10-PCS | Mod: S$GLB,,, | Performed by: PHYSICIAN ASSISTANT

## 2022-08-15 PROCEDURE — 4010F PR ACE/ARB THEARPY RXD/TAKEN: ICD-10-PCS | Mod: CPTII,S$GLB,, | Performed by: PHYSICIAN ASSISTANT

## 2022-08-15 PROCEDURE — 73110 X-RAY EXAM OF WRIST: CPT | Mod: 26,LT,, | Performed by: INTERNAL MEDICINE

## 2022-08-15 PROCEDURE — 99024 POSTOP FOLLOW-UP VISIT: CPT | Mod: S$GLB,,, | Performed by: PHYSICIAN ASSISTANT

## 2022-08-15 PROCEDURE — 99999 PR PBB SHADOW E&M-EST. PATIENT-LVL III: ICD-10-PCS | Mod: PBBFAC,,, | Performed by: PHYSICIAN ASSISTANT

## 2022-08-15 PROCEDURE — 99999 PR PBB SHADOW E&M-EST. PATIENT-LVL III: CPT | Mod: PBBFAC,,, | Performed by: PHYSICIAN ASSISTANT

## 2022-08-15 PROCEDURE — 3008F PR BODY MASS INDEX (BMI) DOCUMENTED: ICD-10-PCS | Mod: CPTII,S$GLB,, | Performed by: PHYSICIAN ASSISTANT

## 2022-08-15 PROCEDURE — 4010F ACE/ARB THERAPY RXD/TAKEN: CPT | Mod: CPTII,S$GLB,, | Performed by: PHYSICIAN ASSISTANT

## 2022-08-15 PROCEDURE — 3008F BODY MASS INDEX DOCD: CPT | Mod: CPTII,S$GLB,, | Performed by: PHYSICIAN ASSISTANT

## 2022-08-15 PROCEDURE — 1159F MED LIST DOCD IN RCRD: CPT | Mod: CPTII,S$GLB,, | Performed by: PHYSICIAN ASSISTANT

## 2022-08-15 PROCEDURE — 1159F PR MEDICATION LIST DOCUMENTED IN MEDICAL RECORD: ICD-10-PCS | Mod: CPTII,S$GLB,, | Performed by: PHYSICIAN ASSISTANT

## 2022-08-15 NOTE — PROGRESS NOTES
"Ms. Lee is here today for a post-operative visit.  She is 14 days status post reconstruction left scapholunate interosseous ligament, pinning scaphoid to capitate by Dr. Arrieta on 8/1/22. She reports that she is doing okay. upon splint removal the pin easily slid out. She admits to using the hand, doing some lifting with the arm including lifting her great granddaughter to change her diapers. Pain is intermittent she is not currently taking pain medication. She denies fever, chills, and sweats since the time of the surgery.     Physical exam:    Vitals:    08/15/22 1511   Weight: 93 kg (205 lb)   Height: 5' 2" (1.575 m)   PainSc:   6     Vital signs are stable, patient is afebrile.  Patient is well dressed and well groomed, no acute distress.  Alert and oriented to person, place, and time.  Post op dressing taken down.  Incision is clean, dry and intact.  There is no erythema or exudate.  There is no sign of any infection. She is NVI. Sutures removed without difficulty.  Good finger ROM. Pin slid out during splint removal. Pin site with no signs of infection.    Assessment:  status post reconstruction left scapholunate interosseous ligament, pinning scaphoid to capitate by Dr. Arrieta on 8/1/22    Plan:  Alee was seen today for post-op evaluation.    Diagnoses and all orders for this visit:    Post-operative state        PO instruction reviewed and provided to patient  Discussed restrictions and non compliance with pt. Discussed pin was likely loosened due to her overuse and lifting with the extremity   Transition to left short arm fiberglass cast today   Will get xray today   RTC 4 wks       Postop plans patient keep the dressing clean dry intact see the patient back in 2 weeks time splint to be exchanged for a cast cast will be in place for another 4 weeks then patient will be scheduled for pin removal at the 6 week laury of note due to the fact that her distal pole was in such poor condition I did discuss " with the patient about this in addition if patient continues to have wrist pain is careful lunate did not heal will do a PRC when we discussed this postoperatively with the patient and her daughter as well

## 2022-09-14 ENCOUNTER — TELEPHONE (OUTPATIENT)
Dept: ORTHOPEDICS | Facility: CLINIC | Age: 62
End: 2022-09-14
Payer: MEDICARE

## 2022-09-15 ENCOUNTER — DOCUMENTATION ONLY (OUTPATIENT)
Dept: ORTHOPEDICS | Facility: CLINIC | Age: 62
End: 2022-09-15
Payer: MEDICARE

## 2022-09-26 ENCOUNTER — TELEPHONE (OUTPATIENT)
Dept: ORTHOPEDICS | Facility: CLINIC | Age: 62
End: 2022-09-26
Payer: MEDICARE

## 2022-09-26 NOTE — TELEPHONE ENCOUNTER
----- Message from Arleen Kaleb sent at 9/26/2022  2:34 PM CDT -----  Type: Patient Call Back    Who called:pt     What is the request in detail:pt requesting to get post op appt. Call pt     Can the clinic reply by [facility]?    Would the patient rather a call back or a response via [facility]? call    Best call back number:[phone] (home)       Additional Information:        
Left VM advising pt to call office back to reschedule post op appt.  
No

## 2022-09-27 ENCOUNTER — TELEPHONE (OUTPATIENT)
Dept: ORTHOPEDICS | Facility: CLINIC | Age: 62
End: 2022-09-27
Payer: MEDICARE

## 2022-09-27 ENCOUNTER — DOCUMENTATION ONLY (OUTPATIENT)
Dept: ORTHOPEDICS | Facility: CLINIC | Age: 62
End: 2022-09-27
Payer: MEDICARE

## 2022-09-27 NOTE — TELEPHONE ENCOUNTER
----- Message from Arleen Manuel sent at 9/27/2022 12:53 PM CDT -----  Type: Patient Call Back    Who called:pt     What is the request in detail:pt calling to reschedule her post op appt. Call pt     Can the clinic reply by [facility]?    Would the patient rather a call back or a response via [facility]? call    Best call back number:[phone] (home)       Additional Information:

## 2022-09-30 ENCOUNTER — TELEPHONE (OUTPATIENT)
Dept: ORTHOPEDICS | Facility: CLINIC | Age: 62
End: 2022-09-30
Payer: MEDICARE

## 2022-09-30 ENCOUNTER — DOCUMENTATION ONLY (OUTPATIENT)
Dept: ORTHOPEDICS | Facility: CLINIC | Age: 62
End: 2022-09-30
Payer: MEDICARE

## 2022-09-30 NOTE — TELEPHONE ENCOUNTER
JOJOM for pt. Reiterating CAROL's concerns as follows:      IMMANUEL King sent to [facility] Staff  Please call patient about her no-show/late cancellations - she is missing her Post-Op follow up care and needs to be seen in clinic. Please make sure she knows the importance of her attending her visits and that her missed visits may effect her surgical outcome.     Requested a call back to the [facility] at [phone] for assistance r/s her appointment.

## 2022-09-30 NOTE — PROGRESS NOTES
Patient late canceled her visit today - her 2PM appt was canceled at 1:06 PM.  She has missed her last two scheduled visits as well.  She has not been seen since 2 weeks PO.

## 2022-09-30 NOTE — Clinical Note
Please call patient about her no-show/late cancellations - she is missing her Post-Op follow up care and needs to be seen in clinic. Please make sure she knows the importance of her attending her visits and that her missed visits may effect her surgical outcome.

## 2022-10-04 ENCOUNTER — OFFICE VISIT (OUTPATIENT)
Dept: ORTHOPEDICS | Facility: CLINIC | Age: 62
End: 2022-10-04
Payer: MEDICARE

## 2022-10-04 ENCOUNTER — HOSPITAL ENCOUNTER (OUTPATIENT)
Dept: RADIOLOGY | Facility: OTHER | Age: 62
Discharge: HOME | End: 2022-10-04
Attending: PHYSICIAN ASSISTANT
Payer: MEDICARE

## 2022-10-04 VITALS
DIASTOLIC BLOOD PRESSURE: 84 MMHG | HEIGHT: 62 IN | WEIGHT: 205 LBS | BODY MASS INDEX: 37.73 KG/M2 | SYSTOLIC BLOOD PRESSURE: 147 MMHG | HEART RATE: 89 BPM

## 2022-10-04 DIAGNOSIS — Z98.890 POST-OPERATIVE STATE: ICD-10-CM

## 2022-10-04 DIAGNOSIS — M25.332 SCAPHOLUNATE DISSOCIATION OF LEFT WRIST: Primary | ICD-10-CM

## 2022-10-04 PROCEDURE — 3008F PR BODY MASS INDEX (BMI) DOCUMENTED: ICD-10-PCS | Mod: CPTII,S$GLB,, | Performed by: PHYSICIAN ASSISTANT

## 2022-10-04 PROCEDURE — 3079F PR MOST RECENT DIASTOLIC BLOOD PRESSURE 80-89 MM HG: ICD-10-PCS | Mod: CPTII,S$GLB,, | Performed by: PHYSICIAN ASSISTANT

## 2022-10-04 PROCEDURE — 1159F MED LIST DOCD IN RCRD: CPT | Mod: CPTII,S$GLB,, | Performed by: PHYSICIAN ASSISTANT

## 2022-10-04 PROCEDURE — 73110 X-RAY EXAM OF WRIST: CPT | Mod: TC,FY,LT

## 2022-10-04 PROCEDURE — 97760 PR ORTHOTIC MGMT&TRAINJ INITIAL ENC EA 15 MINS: ICD-10-PCS | Mod: GP,LT,S$GLB, | Performed by: PHYSICIAN ASSISTANT

## 2022-10-04 PROCEDURE — 3077F SYST BP >= 140 MM HG: CPT | Mod: CPTII,S$GLB,, | Performed by: PHYSICIAN ASSISTANT

## 2022-10-04 PROCEDURE — 97760 ORTHOTIC MGMT&TRAING 1ST ENC: CPT | Mod: GP,LT,S$GLB, | Performed by: PHYSICIAN ASSISTANT

## 2022-10-04 PROCEDURE — 3077F PR MOST RECENT SYSTOLIC BLOOD PRESSURE >= 140 MM HG: ICD-10-PCS | Mod: CPTII,S$GLB,, | Performed by: PHYSICIAN ASSISTANT

## 2022-10-04 PROCEDURE — 73110 XR WRIST COMPLETE 3 VIEWS LEFT: ICD-10-PCS | Mod: 26,LT,, | Performed by: RADIOLOGY

## 2022-10-04 PROCEDURE — 1160F RVW MEDS BY RX/DR IN RCRD: CPT | Mod: CPTII,S$GLB,, | Performed by: PHYSICIAN ASSISTANT

## 2022-10-04 PROCEDURE — 99999 PR PBB SHADOW E&M-EST. PATIENT-LVL V: CPT | Mod: PBBFAC,,, | Performed by: PHYSICIAN ASSISTANT

## 2022-10-04 PROCEDURE — 99999 PR PBB SHADOW E&M-EST. PATIENT-LVL V: ICD-10-PCS | Mod: PBBFAC,,, | Performed by: PHYSICIAN ASSISTANT

## 2022-10-04 PROCEDURE — 99024 PR POST-OP FOLLOW-UP VISIT: ICD-10-PCS | Mod: S$GLB,,, | Performed by: PHYSICIAN ASSISTANT

## 2022-10-04 PROCEDURE — 73110 X-RAY EXAM OF WRIST: CPT | Mod: 26,LT,, | Performed by: RADIOLOGY

## 2022-10-04 PROCEDURE — 4010F PR ACE/ARB THEARPY RXD/TAKEN: ICD-10-PCS | Mod: CPTII,S$GLB,, | Performed by: PHYSICIAN ASSISTANT

## 2022-10-04 PROCEDURE — 1159F PR MEDICATION LIST DOCUMENTED IN MEDICAL RECORD: ICD-10-PCS | Mod: CPTII,S$GLB,, | Performed by: PHYSICIAN ASSISTANT

## 2022-10-04 PROCEDURE — 99024 POSTOP FOLLOW-UP VISIT: CPT | Mod: S$GLB,,, | Performed by: PHYSICIAN ASSISTANT

## 2022-10-04 PROCEDURE — 4010F ACE/ARB THERAPY RXD/TAKEN: CPT | Mod: CPTII,S$GLB,, | Performed by: PHYSICIAN ASSISTANT

## 2022-10-04 PROCEDURE — 3079F DIAST BP 80-89 MM HG: CPT | Mod: CPTII,S$GLB,, | Performed by: PHYSICIAN ASSISTANT

## 2022-10-04 PROCEDURE — 1160F PR REVIEW ALL MEDS BY PRESCRIBER/CLIN PHARMACIST DOCUMENTED: ICD-10-PCS | Mod: CPTII,S$GLB,, | Performed by: PHYSICIAN ASSISTANT

## 2022-10-04 PROCEDURE — 3008F BODY MASS INDEX DOCD: CPT | Mod: CPTII,S$GLB,, | Performed by: PHYSICIAN ASSISTANT

## 2022-10-04 NOTE — PROGRESS NOTES
"Ms. Lee is here today for a post-operative visit.  She is 64 days status post reconstruction left scapholunate interosseous ligament, pinning scaphoid to capitate by Dr. Arrieta on 8/1/22.  She no showed her prior three scheduled follow-up visits.  She has remained in the cast, however reports multiple push and pull type situations and lifting with the left arm within the cast.  Pain is intermittent. She denies fever, chills, and sweats since the time of the surgery.     Per prior note, upon splint removal at 2 weeks postop the pin easily slid out. She admitted to using the hand, doing some lifting with the arm including lifting her great granddaughter to change her diapers.     Physical exam:    Vitals:    10/04/22 1540   BP: (!) 147/84   Pulse: 89   Weight: 93 kg (205 lb)   Height: 5' 2" (1.575 m)   PainSc:   5       Vital signs are stable, patient is afebrile.  Patient is well dressed and well groomed, no acute distress.  Alert and oriented to person, place, and time.  Cast removed.  Incision is clean, dry and intact.  Tender to palpation over the left wrist.  There is no erythema or exudate.  There is no sign of any infection. She is NVI. Good finger ROM.  Pin site healed with no signs of infection.  Poor wrist range of motion, pain with attempted motion.    RADIOLOGY:   Left wrist XRay, 10/4/2022  FINDINGS:  Casting material obscures fine bony detail.  Allowing for this, stable postoperative appearance of the scaphoid and lunate.  Continued widening of the scapholunate interval consistent with underlying ligamentous injury.  No displaced fracture.     Impression:  Please see above.    Assessment: 64 days status post reconstruction left scapholunate interosseous ligament, pinning scaphoid to capitate     Plan:  Alee was seen today for post-op evaluation.    Diagnoses and all orders for this visit:    Scapholunate dissociation of left wrist  -     Ambulatory referral/consult to Physical/Occupational Therapy; " Future  -     X-Ray Wrist Complete Left; Future        - x-rays reviewed with the patient, we also reviewed her intraoperative x-rays and 2 week postop x-rays.  Further widening of the scapholunate interval noted.  Discussed with patient transitioning to a brace and starting occupational therapy.  If her wrist pain it does not improve with bracing in OT she may need to consider additional surgery such as proximal row carpectomy.  - x-rays were reviewed in the treatment options were discussed with Dr. Arrieta as well today prior to discussing with the patient  - left forearm brace provided (15 minutes spent preparing, fitting, and educating on brace).  - OT orders placed, patient instructed on scheduling  - call with questions or concerns  - follow-up in 4-6 weeks        Postop plans patient keep the dressing clean dry intact see the patient back in 2 weeks time splint to be exchanged for a cast cast will be in place for another 4 weeks then patient will be scheduled for pin removal at the 6 week laury of note due to the fact that her distal pole was in such poor condition I did discuss with the patient about this in addition if patient continues to have wrist pain is careful lunate did not heal will do a PRC when we discussed this postoperatively with the patient and her daughter as well

## 2022-10-10 ENCOUNTER — TELEPHONE (OUTPATIENT)
Dept: ORTHOPEDICS | Facility: CLINIC | Age: 62
End: 2022-10-10
Payer: MEDICARE

## 2022-10-10 NOTE — TELEPHONE ENCOUNTER
MANPREETV informing pt that I will send a message to the therapy department to get her scheduled.  Pt was advised to call the office back with any questions or concerns.

## 2022-10-10 NOTE — TELEPHONE ENCOUNTER
----- Message from Priya Farr sent at 10/10/2022 10:55 AM CDT -----  Regarding: Patient call back  Who called: CORNELL CARO [[id number]]    What is the request in detail: Patient is requesting a call back. She states she still has not received a call from the hand therapy department and was advised to contact the staff if that occurred.   Please advise.    Can the clinic reply by [facility]? No    Best call back number: [phone]    Additional Information: N/A

## 2022-10-18 ENCOUNTER — CLINICAL SUPPORT (OUTPATIENT)
Dept: REHABILITATION | Facility: HOSPITAL | Age: 62
End: 2022-10-18
Payer: MEDICARE

## 2022-10-18 DIAGNOSIS — M25.332 SCAPHOLUNATE DISSOCIATION OF LEFT WRIST: ICD-10-CM

## 2022-10-18 DIAGNOSIS — M25.60 DECREASED RANGE OF MOTION: ICD-10-CM

## 2022-10-18 DIAGNOSIS — R52 PAIN: ICD-10-CM

## 2022-10-18 PROCEDURE — 97166 OT EVAL MOD COMPLEX 45 MIN: CPT

## 2022-10-18 NOTE — PATIENT INSTRUCTIONS
- Perform all 10 repetitions, 4-5x/day         Holding pen, move wrist as you were throwing a dart lightly, repeat 10 times, pain free    Claudia Canchola, OTR/L

## 2022-10-20 PROBLEM — R52 PAIN: Status: ACTIVE | Noted: 2022-10-20

## 2022-10-20 PROBLEM — M25.60 DECREASED RANGE OF MOTION: Status: ACTIVE | Noted: 2022-10-20

## 2022-10-20 NOTE — PLAN OF CARE
[facility]  Occupational Therapy Initial Evaluation    Date: 10/18/2022  Name: Alee Lee  Clinic Number: [id number]    Therapy Diagnosis:   Encounter Diagnoses   Name Primary?    Scapholunate dissociation of left wrist     Pain     Decreased range of motion      Physician: Tamela Murillo PA    Physician Orders: Eval and treat;   Medical Diagnosis:    M25.332 (ICD-10-CM) - Scapholunate dissociation of left wrist        Surgical Procedure and Date: Procedure(s) (LRB):  REPAIR, LIGAMENT, WRIST (Left)   , 8/1/2022 OR Date of Injury/Onset: July 2022  Evaluation Date: 10/18/2022  Insurance Authorization Period Expiration: 10/18/2023  Plan of Care Expiration: 10 weeks;  Date of Return to MD: 11/15/2022  Visit # / Visits authorized: 1 / 1  FOTO: (date and score)    Precautions:  Standard, Diabetes, Fall, and Weightbearing    Time In: 11:05 AM  Time Out: 12:00 PM  Total Appointment Time (timed & untimed codes): 55 minutes      SUBJECTIVE     Date of Onset: 08/01/2022    History of Current Condition/Mechanism of Injury: Alee reports: Pt slipped in bathroom and went into flexion. Sx on 8/1/2022. Pain has been elevated. Fell when walking dogs on L side ~ month ago. Pt also reported another episode when opening door her grand daughter pulling inside car making it difficult for her to open- she was holding her other granddaughter with R hand so was forced to use affected hand. Alee verbalized pinning falling out when she was unwrapping her cast at first follow up 2 weeks following sx.     Falls: 1- walking dog 1 month ago fell really hard and landed on shoulder.     Involved Side: Left   Dominant Side: Right  Imaging:  EXAMINATION:  XR WRIST COMPLETE 3 VIEWS LEFT     CLINICAL HISTORY:  Other specified postprocedural states     TECHNIQUE:  PA, lateral, and oblique views of the left wrist were performed.     COMPARISON:  08/15/2022 and 06/03/2022     FINDINGS:  Casting material obscures fine bony  detail.     Allowing for this, stable postoperative appearance of the scaphoid and lunate.  Continued widening of the scapholunate interval consistent with underlying ligamentous injury.  No displaced fracture.     Impression:     Please see above.        Electronically signed by: Edita Hebert  Date:                                            10/04/2022  Prior Therapy: n/a  Occupation:    Working presently: disability since    Duties:     Functional Limitations/Social History:    Previous functional status includes: Independent with all ADLs.     Current Functional Status   Home/Living environment: lives with their family      Limitation of Functional Status as follows:   ADLs/IADLs:     - Feeding: I uses R    - Bathing: severe pain when using L     - Dressing/Grooming: difficulty with pulling up pants passed hips due to pain     - Driving: n/a     Leisure: reading, walking    Pain:  Functional Pain Scale Rating 0-10: Current 8/10, worst 10/10, best 2/10   Location: over incision and L thumb   Description: Aching  Aggravating Factors: functional use, removing brace for shower   Easing Factors: rest and elevation     Patient's Goals for Therapy: Pt would like to stop pain and avoid surgery     Medical History:   Past Medical History:   Diagnosis Date    Depression     Diabetes mellitus     Herpes genitalis in women     Thyroid disease        Surgical History:    has a past surgical history that includes  section and Repair of ligament of wrist (Left, 2022).    Medications:   has a current medication list which includes the following prescription(s): allopurinol, buspirone, duloxetine, gabapentin, glipizide, ibuprofen, levothyroxine, lisinopril, meclizine, meloxicam, metformin, ondansetron, oxybutynin, quetiapine, simvastatin, tramadol, and valacyclovir, and the following Facility-Administered Medications: cefazolin, celecoxib, fentanyl, lidocaine (pf) 10 mg/ml (1%), midazolam, mupirocin, and  mupirocin.    Allergies:   Review of patient's allergies indicates:  No Known Allergies       OBJECTIVE     Observation/Appearance: wearing prefabricated thumb spice, no visible swelling, skin intact in dry     Edema. Measured in centimeters.   10/20/2022 10/20/2022    Left Right   2in. Above elbow     2in. Below elbow     Wrist Crease 18cm 15.7 cm   Figure of 8     MCPs         Elbow and Wrist ROM. Measured in degrees.   10/20/2022 10/20/2022    Left Right   Elbow Ext/Flex WNL/WNL    Supination/Pronation WNL/WNL    Wrist Ext/Flex 34/15 70/52   Wrist RD/UD 10/15      Hand ROM. Measured in degrees.   10/20/2022 10/20/2022    Left Right        Index: MP  Able to make full composite fist          Thumb: MP 30 52                IP 55 66       Rad ADD/ABD 40 40       Pal ADD/ABD 41 44          Opposition Tip of SF DPC      Strength (Dynamometer) and Pinch Strength (Pinch Gauge)  Measured in pounds.   10/20/2022 10/20/2022    Left Right   Rung II Deferred     Key Pinch     3pt Pinch     2pt Pinch       Sensation: denies tingling and numbness   10/18/2022 10/18/2022    Left Right   [place]     Normal 1.65-2.83     Diminished Light Touch 3.22-3.61     Diminished Protective 3.84-4.31     Loss of Protective 4.56-6.65     Untestable >6.65     2 Point Discrimination     Static     Dynamic       Manual Muscle Test   10/20/2022 10/20/2022    Left Right   Wrist Extension  Deferred     Wrist Flexion     Radial Deviation     Ulnar Deviation     Supination     Pronation     Elbow Extension     Elbow Flexion         Limitation/Restriction for FOTO initial eval; wrist Survey    Therapist reviewed FOTO scores for Alee Lee on 10/18/2022.   FOTO documents entered into [employer] - see Media section.    Limitation Score: 69%         Treatment   Total Treatment time (time-based codes) separate from Evaluation: 18 minutes    Alee received the treatments listed below:     Supervised modalities after being cleared for  contradictions: Hot Pack - 8 min to promote increased blood flow and tissue extensibility       Manual therapy techniques:  Manual Lymphatic Drainage, Soft tissue Mobilization were applied to the: L hand for 5 minutes, including:  - demonstrated retrograde massage   - demonstrated scar tissue mobilization     Therapeutic exercises to develop flexibility for 5 minutes, including:  - thumb ROM (palmar abduction, radial abduction/adduction) x 8 reps   - dart thrower's motion x 10 reps       Patient Education and Home Exercises      Education provided:   - edema sleeve 24/7  - HEP frequency       Written Home Exercises Provided: yes.  Exercises were reviewed and Alee was able to demonstrate them prior to the end of the session.  Alee demonstrated good  understanding of the education provided. See EMR under Patient Instructions for exercises provided during therapy sessions.     Pt was advised to perform these exercises free of pain, and to stop performing them if pain occurs.    Patient/Family Education: role of OT, goals for OT, scheduling/cancellations - pt verbalized understanding. Discussed insurance limitations with patient.    ASSESSMENT     Alee Lee is a 62 y.o. female referred to outpatient occupational therapy and presents with a medical diagnosis of scapholunate repair.  Patient presents with the following therapy deficits: Decreased ROM, Decreased functional hand use, Increased pain, Edema, and Scar Adhesions and demonstrates limitations as described in the chart below. Following medical record review it is determined that pt will benefit from occupational therapy services in order to maximize pain free and/or functional use of left wrist. The following goals were discussed with the patient and patient is in agreement with them as to be addressed in the treatment plan. The patient's rehab potential is Good to fair    Anticipated barriers to occupational therapy: 1 month before sx performed, fall following  repair   Pt has no cultural, educational or language barriers to learning provided.    Profile and History Assessment of Occupational Performance Level of Clinical Decision Making Complexity Score   Occupational Profile:   Alee Lee is a 62 y.o. female who lives with their family and is on disability Alee Lee has difficulty with  ADLs and IADLs as listed previously, which  Affecting herdaily functional abilities.      Comorbidities:    has a past medical history of Depression, Diabetes mellitus, Herpes genitalis in women, and Thyroid disease.    Medical and Therapy History Review:   Expanded               Performance Deficits    Physical:  Joint Mobility  Joint Stability  Muscle Power/Strength  Skin Integrity/Scar Formation  Edema   Strength  Pinch Strength  Pain    Cognitive:  No Deficits    Psychosocial:    Habits  Routines  Rituals  Family Support     Clinical Decision Making:  moderate    Assessment Process:  Comprehensive Assessments    Modification/Need for Assistance:  Minimal-Moderate Modifications/Assistance    Intervention Selection:  Multiple Treatment Options       moderate  Based on PMHX, co morbidities , data from assessments and functional level of assistance required with task and clinical presentation directly impacting function.         Goals:   The following goals were discussed with the patient and patient is in agreement with them as to be addressed in the treatment plan.   Long Term Goals (LTGs); to be met by discharge.  1# Pt will report pain no greater than 2-3/10 with light task activities   2# /pinch goal once assessed   3# Pt will demo improved FOTO score based on predicted limitation (42%)   4# Pt will demo increase in wrist flex/ext by 10-15 degrees to be able to perform upper/lower body dressings    Short Term Goals (STGs); to be met within 4 weeks (11/18/2022).  1# Pt will report pain no greater than 5/10 with light task activities   2# Assess  and pinch when  appropriate  3# Pt will report/demo compliance with HEP   4# Pt will demo increase in wrist flex/ext by 5-10 degrees to be able to perform upper/lower body dressings    PLAN   Plan of Care Certification: 10/18/2022 to 12/31/2022.     Outpatient Occupational Therapy 1-2 times weekly for 10 weeks to include the following interventions: Paraffin, Fluidotherapy, Manual therapy/joint mobilizations, Modalities for pain management, US 3 mhz, Therapeutic exercises/activities., Iontophoresis with 2.0 cc Dexamethasone, Strengthening, Orthotic Fabrication/Fit/Training, Edema Control, Scar Management, and Joint Protection.      Claudia Canchola OT      I CERTIFY THE NEED FOR THESE SERVICES FURNISHED UNDER THIS PLAN OF TREATMENT AND WHILE UNDER MY CARE  Physician's comments:      Physician's Signature: ___________________________________________________

## 2022-11-11 ENCOUNTER — TELEPHONE (OUTPATIENT)
Dept: ORTHOPEDICS | Facility: CLINIC | Age: 62
End: 2022-11-11
Payer: MEDICARE

## 2022-11-14 NOTE — PROGRESS NOTES
"Ms. Lee is here today for a post-operative visit.  She is 3.5 months status post reconstruction left scapholunate interosseous ligament, pinning scaphoid to capitate by Dr. Arrieta on 8/1/22.  She has attended one session of OT, canceled the remaining visits due to financial reasons.    She no showed three previous scheduled follow-up visits. She did remain in the cast, however reported multiple push and pull type situations and lifting with the left arm within the cast in the initial 8 weeks of the PO period.  Pain is intermittent. She denies fever, chills, and sweats since the time of the surgery. She does have pain in the left shoulder and upper arm with any motion or use, reports that this is since a fall that occurred 1-2 weeks PO where she landed on the shoulder rather than the hand to protect her surgery.    Per prior note, upon splint removal at 2 weeks postop the pin easily slid out. She admitted to using the hand, doing some lifting with the arm including lifting her great granddaughter to change her diapers.     ROS:  Constitutional: no fever or chills  Skin: no rash or suspicious lesions  Musculoskeletal: See HPI.   Neurological: no headaches, lightheadedness, or dizziness.   Psychological/behavioral: no anxiety or depression      Physical exam:    Vitals:    11/15/22 1435   BP: 104/68   Pulse: 88   Weight: 93 kg (205 lb)   Height: 5' 2" (1.575 m)   PainSc: 0-No pain         Vital signs are stable, patient is afebrile.  Patient is well dressed and well groomed, no acute distress.  Alert and oriented to person, place, and time.  Incision is well healed - clean, dry, and intact.  No significant tenderness over the left wrist. There is no erythema or exudate.  There is no sign of any infection. She is NVI. Good finger ROM.  Poor left wrist range of motion.  Nontender to palpation over the left shoulder, very mildly tender over the proximal half of the left arm.  Good elbow range of motion.  Nearly full " left shoulder motion, mildly limited compared to right.  She does report pain with motion.    RADIOLOGY:   Left wrist XRay, 10/4/2022  FINDINGS:  Casting material obscures fine bony detail.  Allowing for this, stable postoperative appearance of the scaphoid and lunate.  Continued widening of the scapholunate interval consistent with underlying ligamentous injury.  No displaced fracture.  Impression:  Please see above.    Assessment: 3.5 months status post reconstruction left scapholunate interosseous ligament, pinning scaphoid to capitate     Plan:  Diagnoses and all orders for this visit:    Scapholunate dissociation of left wrist    Complete tear of ligament of wrist, left, initial encounter    Orthopedic aftercare    Left shoulder pain, unspecified chronicity  -     X-Ray Shoulder Trauma 3 view Left; Future  -     X-Ray Humerus 2 View Left; Future          - x-rays reviewed with the patient, further widening of the scapholunate interval noted.  Discussed with patient weaning from brace and regular HEP.  She is unable to attend regular OT due to the co-pay cost. If her wrist pain it does not improve with bracing in OT she may need to consider additional surgery such as proximal row carpectomy.  - discussed patient's left shoulder and upper arm pain that she reports is from a fall 1-2 weeks postop  - left shoulder and humerus x-rays ordered, patient is unable to do these today but will call back to schedule  - discussed PT and OT for the left wrist and shoulder, however patient has a high therapy co-pay.  She will discussed with her insurance to see if there is a more in network therapy office  - call with questions or concerns  - follow-up after x-ray       Postop plans patient keep the dressing clean dry intact see the patient back in 2 weeks time splint to be exchanged for a cast cast will be in place for another 4 weeks then patient will be scheduled for pin removal at the 6 week [place] of note due to the fact  that her distal pole was in such poor condition I did discuss with the patient about this in addition if patient continues to have wrist pain is careful lunate did not heal will do a PRC when we discussed this postoperatively with the patient and her daughter as well

## 2022-11-15 ENCOUNTER — OFFICE VISIT (OUTPATIENT)
Dept: ORTHOPEDICS | Facility: CLINIC | Age: 62
End: 2022-11-15
Payer: MEDICARE

## 2022-11-15 ENCOUNTER — HOSPITAL ENCOUNTER (OUTPATIENT)
Dept: RADIOLOGY | Facility: OTHER | Age: 62
Discharge: HOME | End: 2022-11-15
Attending: PHYSICIAN ASSISTANT
Payer: MEDICARE

## 2022-11-15 VITALS
DIASTOLIC BLOOD PRESSURE: 68 MMHG | WEIGHT: 205 LBS | SYSTOLIC BLOOD PRESSURE: 104 MMHG | HEIGHT: 62 IN | HEART RATE: 88 BPM | BODY MASS INDEX: 37.73 KG/M2

## 2022-11-15 DIAGNOSIS — M25.332 SCAPHOLUNATE DISSOCIATION OF LEFT WRIST: Primary | ICD-10-CM

## 2022-11-15 DIAGNOSIS — M25.332 SCAPHOLUNATE DISSOCIATION OF LEFT WRIST: ICD-10-CM

## 2022-11-15 DIAGNOSIS — S63.502A COMPLETE TEAR OF LIGAMENT OF WRIST, LEFT, INITIAL ENCOUNTER: ICD-10-CM

## 2022-11-15 DIAGNOSIS — M25.512 LEFT SHOULDER PAIN, UNSPECIFIED CHRONICITY: ICD-10-CM

## 2022-11-15 DIAGNOSIS — Z47.89 ORTHOPEDIC AFTERCARE: ICD-10-CM

## 2022-11-15 PROCEDURE — 99999 PR PBB SHADOW E&M-EST. PATIENT-LVL IV: CPT | Mod: PBBFAC,,, | Performed by: PHYSICIAN ASSISTANT

## 2022-11-15 PROCEDURE — 1159F PR MEDICATION LIST DOCUMENTED IN MEDICAL RECORD: ICD-10-PCS | Mod: CPTII,S$GLB,, | Performed by: PHYSICIAN ASSISTANT

## 2022-11-15 PROCEDURE — 73110 X-RAY EXAM OF WRIST: CPT | Mod: 26,LT,, | Performed by: RADIOLOGY

## 2022-11-15 PROCEDURE — 3008F BODY MASS INDEX DOCD: CPT | Mod: CPTII,S$GLB,, | Performed by: PHYSICIAN ASSISTANT

## 2022-11-15 PROCEDURE — 99999 PR PBB SHADOW E&M-EST. PATIENT-LVL IV: ICD-10-PCS | Mod: PBBFAC,,, | Performed by: PHYSICIAN ASSISTANT

## 2022-11-15 PROCEDURE — 73110 X-RAY EXAM OF WRIST: CPT | Mod: TC,FY,LT

## 2022-11-15 PROCEDURE — 99213 OFFICE O/P EST LOW 20 MIN: CPT | Mod: S$GLB,,, | Performed by: PHYSICIAN ASSISTANT

## 2022-11-15 PROCEDURE — 3074F PR MOST RECENT SYSTOLIC BLOOD PRESSURE < 130 MM HG: ICD-10-PCS | Mod: CPTII,S$GLB,, | Performed by: PHYSICIAN ASSISTANT

## 2022-11-15 PROCEDURE — 73110 XR WRIST COMPLETE 3 VIEWS LEFT: ICD-10-PCS | Mod: 26,LT,, | Performed by: RADIOLOGY

## 2022-11-15 PROCEDURE — 1160F PR REVIEW ALL MEDS BY PRESCRIBER/CLIN PHARMACIST DOCUMENTED: ICD-10-PCS | Mod: CPTII,S$GLB,, | Performed by: PHYSICIAN ASSISTANT

## 2022-11-15 PROCEDURE — 99213 PR OFFICE/OUTPT VISIT, EST, LEVL III, 20-29 MIN: ICD-10-PCS | Mod: S$GLB,,, | Performed by: PHYSICIAN ASSISTANT

## 2022-11-15 PROCEDURE — 1159F MED LIST DOCD IN RCRD: CPT | Mod: CPTII,S$GLB,, | Performed by: PHYSICIAN ASSISTANT

## 2022-11-15 PROCEDURE — 4010F PR ACE/ARB THEARPY RXD/TAKEN: ICD-10-PCS | Mod: CPTII,S$GLB,, | Performed by: PHYSICIAN ASSISTANT

## 2022-11-15 PROCEDURE — 4010F ACE/ARB THERAPY RXD/TAKEN: CPT | Mod: CPTII,S$GLB,, | Performed by: PHYSICIAN ASSISTANT

## 2022-11-15 PROCEDURE — 1160F RVW MEDS BY RX/DR IN RCRD: CPT | Mod: CPTII,S$GLB,, | Performed by: PHYSICIAN ASSISTANT

## 2022-11-15 PROCEDURE — 3074F SYST BP LT 130 MM HG: CPT | Mod: CPTII,S$GLB,, | Performed by: PHYSICIAN ASSISTANT

## 2022-11-15 PROCEDURE — 3078F PR MOST RECENT DIASTOLIC BLOOD PRESSURE < 80 MM HG: ICD-10-PCS | Mod: CPTII,S$GLB,, | Performed by: PHYSICIAN ASSISTANT

## 2022-11-15 PROCEDURE — 3078F DIAST BP <80 MM HG: CPT | Mod: CPTII,S$GLB,, | Performed by: PHYSICIAN ASSISTANT

## 2022-11-15 PROCEDURE — 3008F PR BODY MASS INDEX (BMI) DOCUMENTED: ICD-10-PCS | Mod: CPTII,S$GLB,, | Performed by: PHYSICIAN ASSISTANT

## 2023-10-24 NOTE — TELEPHONE ENCOUNTER
LVM to assist pt with scheduling appt with Dr. Adal Joseph. MCM also sent.   
How Severe Is This Condition?: mild

## (undated) DEVICE — DRESSING N ADH OIL EMUL 3X3

## (undated) DEVICE — PADDING CAST 4IN SPECIALIST

## (undated) DEVICE — SOL 9P NACL IRR PIC IL

## (undated) DEVICE — GLOVE BIOGEL PI MICRO INDIC 7

## (undated) DEVICE — BANDAGE MATRIX HK LOOP 2IN 5YD

## (undated) DEVICE — SUT LABRALTAPE 1.5X36 WHITE

## (undated) DEVICE — CONTAINER SPECIMEN OR STER 4OZ

## (undated) DEVICE — NDL HYPO REG 25G X 1 1/2

## (undated) DEVICE — SUT MONOCRYL PLUS 4-0 P3

## (undated) DEVICE — UNDERPAD ULTRASORB 300LB 30X36

## (undated) DEVICE — GLOVE BIOGEL ECLIPSE SZ 7

## (undated) DEVICE — SUT 4/0 18IN PDS II CLR MO

## (undated) DEVICE — ADHESIVE DERMABOND ADVANCED

## (undated) DEVICE — GLOVE BIOGEL SKINSENSE PI 7.0

## (undated) DEVICE — GUIDEWIRE .054X7IN
Type: IMPLANTABLE DEVICE | Site: WRIST | Status: NON-FUNCTIONAL
Removed: 2022-08-01

## (undated) DEVICE — ANCHOR DX SWIVELOCK SL 3.5X8
Type: IMPLANTABLE DEVICE | Site: WRIST | Status: NON-FUNCTIONAL
Removed: 2022-08-01

## (undated) DEVICE — SUT ETHILON 4-0 BLK MONO

## (undated) DEVICE — Device

## (undated) DEVICE — BANDAGE MATRIX HK LOOP 4IN 5YD

## (undated) DEVICE — SUT 4.0 ETHILON

## (undated) DEVICE — SUT FIBERWIRE 4-0 18 IN TAP

## (undated) DEVICE — BUCKET PLASTER DISPOSABLE

## (undated) DEVICE — GAUZE SPONGE 4X4 12PLY

## (undated) DEVICE — SPLINT PLASTER FAST SET 5X30IN

## (undated) DEVICE — TOURNIQUET SB QC DP 18X4IN

## (undated) DEVICE — BLADE SCALP OPHTL RND TIP